# Patient Record
Sex: FEMALE | Race: WHITE | NOT HISPANIC OR LATINO | Employment: FULL TIME | URBAN - METROPOLITAN AREA
[De-identification: names, ages, dates, MRNs, and addresses within clinical notes are randomized per-mention and may not be internally consistent; named-entity substitution may affect disease eponyms.]

---

## 2017-11-20 ENCOUNTER — ALLSCRIPTS OFFICE VISIT (OUTPATIENT)
Dept: OTHER | Facility: OTHER | Age: 27
End: 2017-11-20

## 2017-11-21 NOTE — PROGRESS NOTES
Assessment  1  Encounter for gynecological examination with abnormal finding (V72 31) (Z01 411)   2  Cervical cancer screening (V76 2) (Z12 4)    Plan  Cervical cancer screening    · (Q) THINPREP TIS PAP RFX HPV; Status:Active; Requested for:2017;    Perform:[facility]; Order Comments:28 yo P1 POP for BC, no LMP  no hx abnl papcvx/endocvx; Due:2018; Ordered;cancer screening; Ordered By:Mini Cruz;    Discussion/Summary  Currently, she eats an adequate diet and has an adequate exercise regimen  Pap test was done today Breast cancer screening: breast cancer screening is not indicated  Colorectal cancer screening: colorectal cancer screening is not indicated  Osteoporosis screening: bone mineral density testing is not indicated  Advice and education were given regarding aerobic exercise, weight bearing exercise, calcium supplements, vitamin D supplements and reproductive health  Patient discussion: discussed with the patient  RTO if feels pregnant or no menses 3 months from last POP  The patient has the current Goals: Pregnancy  The patent has the current Barriers:   Patient is able to Self-Care  History of Present Illness  HPI: 33 yo G P1 c LMP none with lactation and POP  Discontinued POP 4-6 wks ago  No longer nursing  lifetime safe sexual practices followed does/not feel safe in relationship2-3x/dyD in [facility] videos 4-5x/wkas abovevaccination: completed seriesNA      OB History  Pregnancy History (Brief):  Prior pregnancies: : 1   Para: 1 (full-term)-- and-- 1 (living)  Delivery type: 1 vaginal       Past Medical History   · Denied: History of abnormal cervical Pap smear   · History of herpes simplex infection (V12 09) (Z86 19)   · History of pregnancy (V13 29)   · Denied: History of sexually transmitted disease   · History of vaccination against human papillomavirus (V45 89) (Z92 29)   · History of Varicella without complication (914 5) (X81 7)    The active problems and past medical history were reviewed and updated today  Surgical History   · Denied: History Of Prior Surgery    The surgical history was reviewed and updated today  Family History  Father    · Family history of hypertension (V17 49) (Z82 49)  Paternal Grandmother    · Family history of stroke (V17 1) (Z82 3)  Family History    · Denied: Family history of chronic renal disease   · Denied: Family history of cirrhosis of liver   · Denied: Family history of depression   · Denied: Family history of DVT   · Denied: Family history of hypercholesterolemia   · Denied: Family history of ischemic heart disease   · Denied: Family history of osteoporosis   · Denied: Family history of schizophrenia   · Denied: Family history of thyroid disorder   · Denied: Family history of type 2 diabetes mellitus    The family history was reviewed and updated today  Social History     · Alcohol use (V49 89) (Z78 9)   · 1x/wk when not pregnant   · Denied: History of Drug use   · Never a smoker   · No preference on Zoroastrianism beliefs   · Occupation   ·  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   · Denied: History of Judaism beliefs on blood and blood products  The social history was reviewed and updated today  Current Meds   1  Daily Multivitamin TABS; Therapy: (Recorded:20Nov2017) to Recorded    Allergies  1  No Known Drug Allergies    Vitals   Recorded: [id number] 21:12TO   Systolic 266, LUE, Sitting   Diastolic 60, LUE, Sitting   Height 5 ft 8 9 in   Weight 150 lb    BMI Calculated 22 21   BSA Calculated 1 83       Physical Exam   Constitutional  General appearance: No acute distress, well appearing and well nourished  Neck  Neck: Normal, supple, trachea midline, no masses  Thyroid: Normal, no thyromegaly  Pulmonary  Respiratory effort: No increased work of breathing or signs of respiratory distress  Genitourinary  External genitalia: Normal and no lesions appreciated  Vagina: Normal, no lesions or dryness appreciated  Urethral meatus: Normal    Bladder: Normal, soft, non-tender and no prolapse or masses appreciated  Cervix: Normal, no palpable masses  -- parous s lesions  m od ectropion  Uterus: Normal, non-tender, not enlarged, and no palpable masses  -- NSSC, AF, NT, mobile  Adnexa/parametria: Normal, non-tender and no fullness or masses appreciated  -- no masses or tenderenss  Anus, perineum, and rectum: Normal sphincter tone, no masses, and no prolapse  No masses or nodularity  Chest  Breasts: Normal and no dimpling or skin changes noted  Abdomen  Abdomen: Normal, non-tender, and no organomegaly noted  Liver and spleen: No hepatomegaly or splenomegaly  Examination for hernias: No hernias appreciated  Lymphatic  Palpation of lymph nodes in neck, axillae, groin and/or other locations: No lymphadenopathy or masses noted  Skin  Skin and subcutaneous tissue: Normal skin turgor and no rashes     Psychiatric  Orientation to person, place, and time: Normal    Mood and affect: Normal        Signatures   Electronically signed by : KEANU Curry ; Nov 20 2017  4:31PM EST                       (Author)

## 2017-11-22 LAB
ADDITIONAL INFORMATION (HISTORICAL): NORMAL
ADEQUACY: (HISTORICAL): NORMAL
COMMENT (HISTORICAL): NORMAL
CYTOTECHNOLOGIST: (HISTORICAL): NORMAL
INTERPRETATION (HISTORICAL): NORMAL
LMP (HISTORICAL): NORMAL
PREV. BX: (HISTORICAL): NORMAL
PREV. PAP (HISTORICAL): NORMAL
SOURCE (HISTORICAL): NORMAL

## 2017-11-25 ENCOUNTER — GENERIC CONVERSION - ENCOUNTER (OUTPATIENT)
Dept: OTHER | Facility: OTHER | Age: 27
End: 2017-11-25

## 2018-01-11 NOTE — RESULT NOTES
Verified Results  (Q) THINPREP TIS PAP RFX HPV [id number] 12:00AM [facility]     Test Name Result Flag Reference   CLINICAL INFORMATION: None given     LMP: NONE     PREV  PAP: NO HX ABNL PAP     PREV  BX: NONE GIVEN     SOURCE: Cervix, Endocervix     STATEMENT OF ADEQUACY:      Satisfactory for evaluation  Endocervical/transformation zone component absent  Age and/or menstrual status not provided   INTERPRETATION/RESULT:      Negative for intraepithelial lesion or malignancy  COMMENT:      This Pap test has been evaluated with computer  assisted technology     CYTOTECHNOLOGIST:      AISLINN CT(ASCP)  CT screening location: [address] [place] [address]

## 2018-01-13 VITALS
SYSTOLIC BLOOD PRESSURE: 100 MMHG | BODY MASS INDEX: 22.22 KG/M2 | DIASTOLIC BLOOD PRESSURE: 60 MMHG | WEIGHT: 150 LBS | HEIGHT: 69 IN

## 2018-02-26 ENCOUNTER — OFFICE VISIT (OUTPATIENT)
Dept: OBGYN CLINIC | Facility: CLINIC | Age: 28
End: 2018-02-26
Payer: COMMERCIAL

## 2018-02-26 VITALS — BODY MASS INDEX: 22.07 KG/M2 | WEIGHT: 149 LBS

## 2018-02-26 DIAGNOSIS — N94.89 SUPPRESSION, MENSTRUATION: Primary | ICD-10-CM

## 2018-02-26 LAB — SL AMB POCT URINE HCG: POSITIVE

## 2018-02-26 PROCEDURE — 81025 URINE PREGNANCY TEST: CPT

## 2018-02-27 NOTE — PROGRESS NOTES
Pt  Presents for walk in HCG test  In office test positive  She is currently 5wks 1 day with an LMP 01/20/2018  LITO 10/25/2018

## 2018-03-30 ENCOUNTER — TRANSCRIBE ORDERS (OUTPATIENT)
Dept: ADMINISTRATIVE | Facility: HOSPITAL | Age: 28
End: 2018-03-30

## 2018-03-30 DIAGNOSIS — Z34.81 PRENATAL CARE, SUBSEQUENT PREGNANCY, FIRST TRIMESTER: Primary | ICD-10-CM

## 2018-04-02 ENCOUNTER — HOSPITAL ENCOUNTER (OUTPATIENT)
Dept: ULTRASOUND IMAGING | Facility: HOSPITAL | Age: 28
Discharge: HOME | End: 2018-04-02
Attending: OBSTETRICS & GYNECOLOGY

## 2018-04-02 DIAGNOSIS — Z34.81 PRENATAL CARE, SUBSEQUENT PREGNANCY, FIRST TRIMESTER: ICD-10-CM

## 2018-04-17 ENCOUNTER — ROUTINE PRENATAL (OUTPATIENT)
Dept: PERINATAL CARE | Facility: CLINIC | Age: 28
End: 2018-04-17
Payer: COMMERCIAL

## 2018-04-17 VITALS
HEART RATE: 66 BPM | DIASTOLIC BLOOD PRESSURE: 76 MMHG | SYSTOLIC BLOOD PRESSURE: 116 MMHG | WEIGHT: 150 LBS | HEIGHT: 70 IN | BODY MASS INDEX: 21.47 KG/M2

## 2018-04-17 DIAGNOSIS — Z3A.12 12 WEEKS GESTATION OF PREGNANCY: ICD-10-CM

## 2018-04-17 DIAGNOSIS — Z82.79 FAMILY HISTORY OF CONGENITAL HEART DEFECT: ICD-10-CM

## 2018-04-17 DIAGNOSIS — Z36.82 ENCOUNTER FOR ANTENATAL SCREENING FOR NUCHAL TRANSLUCENCY: Primary | ICD-10-CM

## 2018-04-17 PROCEDURE — 76813 OB US NUCHAL MEAS 1 GEST: CPT | Performed by: OBSTETRICS & GYNECOLOGY

## 2018-04-17 PROCEDURE — 76801 OB US < 14 WKS SINGLE FETUS: CPT | Performed by: OBSTETRICS & GYNECOLOGY

## 2018-04-17 PROCEDURE — 99201 PR OFFICE OUTPATIENT NEW 10 MINUTES: CPT | Performed by: OBSTETRICS & GYNECOLOGY

## 2018-04-17 NOTE — PROGRESS NOTES
MARY Carlos 53: Ms Sukumar Noble was seen today at 12w2d for nuchal translucency ultrasound  See ultrasound report under "OB Procedures" tab  Please don't hesitate to contact our office with any concerns or questions    Jami Snow MD

## 2018-04-17 NOTE — PATIENT INSTRUCTIONS
Thank you for choosing [facility] for your  care today  If you have any questions about your ultrasound or care, please do not hesitate to contact us or your primary obstetrician  Please return in 7-8 weeks for your next ultrasound to check on the fetal anatomy  I also advise fetal echocardiogram at 22-24 weeks  If you can find them, please bring in your records and your partner records on the cystic fibrosis testing so that we can review

## 2018-04-26 ENCOUNTER — TELEPHONE (OUTPATIENT)
Dept: PERINATAL CARE | Facility: CLINIC | Age: 28
End: 2018-04-26

## 2018-05-31 ENCOUNTER — TRANSCRIBE ORDERS (OUTPATIENT)
Dept: ADMINISTRATIVE | Facility: HOSPITAL | Age: 28
End: 2018-05-31

## 2018-05-31 ENCOUNTER — APPOINTMENT (OUTPATIENT)
Dept: LAB | Facility: HOSPITAL | Age: 28
End: 2018-05-31
Attending: OBSTETRICS & GYNECOLOGY
Payer: COMMERCIAL

## 2018-05-31 DIAGNOSIS — Z33.1 PREGNANT STATE, INCIDENTAL: ICD-10-CM

## 2018-05-31 DIAGNOSIS — Z36.9 RESEARCH REQUESTED ANTENATAL ULTRASOUND SCAN: ICD-10-CM

## 2018-05-31 DIAGNOSIS — Z36.9 RESEARCH REQUESTED ANTENATAL ULTRASOUND SCAN: Primary | ICD-10-CM

## 2018-05-31 PROCEDURE — 36415 COLL VENOUS BLD VENIPUNCTURE: CPT

## 2018-06-01 LAB — SCAN RESULT: NORMAL

## 2018-06-05 ENCOUNTER — ROUTINE PRENATAL (OUTPATIENT)
Dept: PERINATAL CARE | Facility: CLINIC | Age: 28
End: 2018-06-05
Payer: COMMERCIAL

## 2018-06-05 VITALS
BODY MASS INDEX: 21.9 KG/M2 | HEIGHT: 70 IN | SYSTOLIC BLOOD PRESSURE: 100 MMHG | WEIGHT: 153 LBS | HEART RATE: 78 BPM | DIASTOLIC BLOOD PRESSURE: 68 MMHG

## 2018-06-05 DIAGNOSIS — Z14.1 CYSTIC FIBROSIS CARRIER IN SECOND TRIMESTER, ANTEPARTUM: ICD-10-CM

## 2018-06-05 DIAGNOSIS — O09.892 CYSTIC FIBROSIS CARRIER IN SECOND TRIMESTER, ANTEPARTUM: ICD-10-CM

## 2018-06-05 DIAGNOSIS — Z36.3 ENCOUNTER FOR ANTENATAL SCREENING FOR MALFORMATIONS: Primary | ICD-10-CM

## 2018-06-05 DIAGNOSIS — Z36.86 ENCOUNTER FOR ANTENATAL SCREENING FOR CERVICAL LENGTH: ICD-10-CM

## 2018-06-05 DIAGNOSIS — Z82.79 FAMILY HISTORY OF CONGENITAL HEART DEFECT: ICD-10-CM

## 2018-06-05 DIAGNOSIS — Z3A.19 19 WEEKS GESTATION OF PREGNANCY: ICD-10-CM

## 2018-06-05 PROCEDURE — 99212 OFFICE O/P EST SF 10 MIN: CPT | Performed by: OBSTETRICS & GYNECOLOGY

## 2018-06-05 NOTE — PROGRESS NOTES
MARY Carlos 53: Ms William Jones was seen today at 19w2d for anatomic survey and cervical length screening ultrasound  See ultrasound report under "OB Procedures" tab  Please don't hesitate to contact our office with any concerns or questions    Linda Randall MD

## 2018-06-05 NOTE — PATIENT INSTRUCTIONS
Thank you for choosing [facility] for your  care today  If you have any questions about your ultrasound or care, please do not hesitate to contact us or your primary obstetrician      Please return in 4 weeks for your next ultrasound to check on the fetal echocardiogram

## 2018-06-05 NOTE — PROGRESS NOTES
A transvaginal ultrasound was performed  Sonographer note on use of High Level Disinfection Process (Trophon) for transvaginal probe# 2 used, serial [id number]    [address] Hand

## 2018-06-26 ENCOUNTER — ROUTINE PRENATAL (OUTPATIENT)
Dept: PERINATAL CARE | Facility: CLINIC | Age: 28
End: 2018-06-26
Payer: COMMERCIAL

## 2018-06-26 VITALS
WEIGHT: 158 LBS | SYSTOLIC BLOOD PRESSURE: 105 MMHG | BODY MASS INDEX: 22.62 KG/M2 | HEIGHT: 70 IN | DIASTOLIC BLOOD PRESSURE: 67 MMHG

## 2018-06-26 DIAGNOSIS — Z82.79 FAMILY HISTORY OF CONGENITAL HEART DEFECT: Primary | ICD-10-CM

## 2018-06-26 DIAGNOSIS — Z3A.22 22 WEEKS GESTATION OF PREGNANCY: ICD-10-CM

## 2018-06-26 PROCEDURE — 93325 DOPPLER ECHO COLOR FLOW MAPG: CPT | Performed by: OBSTETRICS & GYNECOLOGY

## 2018-06-26 PROCEDURE — 76825 ECHO EXAM OF FETAL HEART: CPT | Performed by: OBSTETRICS & GYNECOLOGY

## 2018-06-26 PROCEDURE — 76827 ECHO EXAM OF FETAL HEART: CPT | Performed by: OBSTETRICS & GYNECOLOGY

## 2018-07-26 ENCOUNTER — ULTRASOUND (OUTPATIENT)
Dept: PERINATAL CARE | Facility: CLINIC | Age: 28
End: 2018-07-26
Payer: COMMERCIAL

## 2018-07-26 VITALS
SYSTOLIC BLOOD PRESSURE: 108 MMHG | WEIGHT: 165 LBS | BODY MASS INDEX: 23.62 KG/M2 | HEART RATE: 76 BPM | HEIGHT: 70 IN | DIASTOLIC BLOOD PRESSURE: 64 MMHG

## 2018-07-26 DIAGNOSIS — Z3A.26 26 WEEKS GESTATION OF PREGNANCY: ICD-10-CM

## 2018-07-26 DIAGNOSIS — O35.2XX0 HEREDITARY DISEASE IN FAMILY POSSIBLY AFFECTING FETUS, AFFECTING MANAGEMENT OF MOTHER IN PREGNANCY, SINGLE OR UNSPECIFIED FETUS: ICD-10-CM

## 2018-07-26 DIAGNOSIS — Z36.4 ULTRASOUND FOR ANTENATAL SCREENING FOR FETAL GROWTH RETARDATION: Primary | ICD-10-CM

## 2018-07-26 PROCEDURE — 76816 OB US FOLLOW-UP PER FETUS: CPT | Performed by: OBSTETRICS & GYNECOLOGY

## 2018-07-26 PROCEDURE — 99212 OFFICE O/P EST SF 10 MIN: CPT | Performed by: OBSTETRICS & GYNECOLOGY

## 2018-07-26 NOTE — PROGRESS NOTES
Please refer to the [facility] ultrasound report in Ob Procedures for additional information regarding the visit to the [facility]  today

## 2018-11-02 ENCOUNTER — ROUTINE PRENATAL (OUTPATIENT)
Dept: PERINATAL CARE | Facility: CLINIC | Age: 28
End: 2018-11-02
Payer: COMMERCIAL

## 2018-11-02 ENCOUNTER — TRANSCRIBE ORDERS (OUTPATIENT)
Dept: PERINATAL CARE | Facility: CLINIC | Age: 28
End: 2018-11-02

## 2018-11-02 VITALS
HEART RATE: 77 BPM | SYSTOLIC BLOOD PRESSURE: 115 MMHG | DIASTOLIC BLOOD PRESSURE: 69 MMHG | HEIGHT: 70 IN | BODY MASS INDEX: 26.8 KG/M2 | WEIGHT: 187.2 LBS

## 2018-11-02 DIAGNOSIS — Z82.79 FAMILY HISTORY OF CONGENITAL HEART DEFECT: ICD-10-CM

## 2018-11-02 DIAGNOSIS — Z14.1 CYSTIC FIBROSIS CARRIER IN SECOND TRIMESTER, ANTEPARTUM: ICD-10-CM

## 2018-11-02 DIAGNOSIS — Z3A.40 40 WEEKS GESTATION OF PREGNANCY: Primary | ICD-10-CM

## 2018-11-02 DIAGNOSIS — O09.892 CYSTIC FIBROSIS CARRIER IN SECOND TRIMESTER, ANTEPARTUM: ICD-10-CM

## 2018-11-02 PROCEDURE — 76815 OB US LIMITED FETUS(S): CPT | Performed by: OBSTETRICS & GYNECOLOGY

## 2018-11-02 PROCEDURE — 59025 FETAL NON-STRESS TEST: CPT | Performed by: OBSTETRICS & GYNECOLOGY

## 2018-11-02 NOTE — PROGRESS NOTES
Ms Laya Serra is a 28 yo  at 36 5/7  weeks gestation who presents for a fetal ultrasound examination for evaluation of HANG and NST  Hx of term pregnancy and of a previous pregnancy complicated by cardiac defect     She is asymptomatic  See Ob procedures  1  HANG  wnl at 16 5  2  NST reactive  Recommendations; 1  Twice a week NSTs, once a week HANG  2  Fetal movement counts      Job Villarreal MD

## 2018-11-02 NOTE — PROGRESS NOTES
nursing education was provided by Irene Moffett RN today on kick counting, labor precautions, and on warning signs that should prompt her to call her physician  The NST procedure and expected outcome were explained to patient  Preeclampsia precautions were reviewed where applicable  She was instructed to call her obstetrician with any questions or concerns, to monitor fetal movement, and to notify her obstetrician if she notices decreased fetal movement    She verbalized understanding and all questions were answered  -Irene Moffett RN

## 2018-11-05 ENCOUNTER — ROUTINE PRENATAL (OUTPATIENT)
Dept: PERINATAL CARE | Facility: CLINIC | Age: 28
End: 2018-11-05
Payer: COMMERCIAL

## 2018-11-05 VITALS
SYSTOLIC BLOOD PRESSURE: 116 MMHG | HEIGHT: 70 IN | DIASTOLIC BLOOD PRESSURE: 81 MMHG | WEIGHT: 189 LBS | HEART RATE: 78 BPM | BODY MASS INDEX: 27.06 KG/M2

## 2018-11-05 DIAGNOSIS — O48.0 POST TERM PREGNANCY AT 41 WEEKS GESTATION: Primary | ICD-10-CM

## 2018-11-05 DIAGNOSIS — Z3A.41 POST TERM PREGNANCY AT 41 WEEKS GESTATION: Primary | ICD-10-CM

## 2018-11-05 PROCEDURE — 76815 OB US LIMITED FETUS(S): CPT | Performed by: OBSTETRICS & GYNECOLOGY

## 2018-11-05 PROCEDURE — 59025 FETAL NON-STRESS TEST: CPT | Performed by: OBSTETRICS & GYNECOLOGY

## 2018-11-05 NOTE — PROGRESS NOTES
MARY Carlos 53: Ms John Kruse was seen today at 41w1d for NST (found under the pregnancy episode) which I reviewed the RN assessment and agree, and HANG (see ultrasound report under OB procedures tab)  Please don't hesitate to contact our office with any concerns or questions    Aster Martinez MD

## 2018-11-07 ENCOUNTER — ANESTHESIA EVENT (INPATIENT)
Dept: LABOR AND DELIVERY | Facility: HOSPITAL | Age: 28
End: 2018-11-07
Payer: COMMERCIAL

## 2018-11-07 ENCOUNTER — HOSPITAL ENCOUNTER (INPATIENT)
Facility: HOSPITAL | Age: 28
LOS: 1 days | Discharge: HOME | End: 2018-11-08
Attending: OBSTETRICS & GYNECOLOGY | Admitting: OBSTETRICS & GYNECOLOGY
Payer: COMMERCIAL

## 2018-11-07 ENCOUNTER — ANESTHESIA (INPATIENT)
Dept: LABOR AND DELIVERY | Facility: HOSPITAL | Age: 28
End: 2018-11-07
Payer: COMMERCIAL

## 2018-11-07 ENCOUNTER — HOSPITAL ENCOUNTER (OUTPATIENT)
Dept: LABOR AND DELIVERY | Facility: HOSPITAL | Age: 28
Discharge: HOME | End: 2018-11-07
Payer: COMMERCIAL

## 2018-11-07 DIAGNOSIS — Z3A.41 41 WEEKS GESTATION OF PREGNANCY: Primary | ICD-10-CM

## 2018-11-07 PROBLEM — O48.0 41 WEEKS GESTATION OF PREGNANCY: Status: ACTIVE | Noted: 2018-11-07

## 2018-11-07 LAB
ABO GROUP BLD: NORMAL
BASOPHILS # BLD AUTO: 0.05 THOUSANDS/ΜL (ref 0–0.1)
BASOPHILS NFR BLD AUTO: 0 % (ref 0–1)
BLD GP AB SCN SERPL QL: POSITIVE
BLOOD GROUP ANTIBODIES SERPL: NORMAL
EOSINOPHIL # BLD AUTO: 0.14 THOUSAND/ΜL (ref 0–0.61)
EOSINOPHIL NFR BLD AUTO: 1 % (ref 0–6)
ERYTHROCYTE [DISTWIDTH] IN BLOOD BY AUTOMATED COUNT: 13.2 % (ref 11.6–15.1)
HCT VFR BLD AUTO: 36.9 % (ref 34.8–46.1)
HGB BLD-MCNC: 12.8 G/DL (ref 11.5–15.4)
IMM GRANULOCYTES # BLD AUTO: 0.06 THOUSAND/UL (ref 0–0.2)
IMM GRANULOCYTES NFR BLD AUTO: 1 % (ref 0–2)
LYMPHOCYTES # BLD AUTO: 1.99 THOUSANDS/ΜL (ref 0.6–4.47)
LYMPHOCYTES NFR BLD AUTO: 17 % (ref 14–44)
MCH RBC QN AUTO: 33.7 PG (ref 26.8–34.3)
MCHC RBC AUTO-ENTMCNC: 34.7 G/DL (ref 31.4–37.4)
MCV RBC AUTO: 97 FL (ref 82–98)
MONOCYTES # BLD AUTO: 0.85 THOUSAND/ΜL (ref 0.17–1.22)
MONOCYTES NFR BLD AUTO: 7 % (ref 4–12)
NEUTROPHILS # BLD AUTO: 8.52 THOUSANDS/ΜL (ref 1.85–7.62)
NEUTS SEG NFR BLD AUTO: 74 % (ref 43–75)
NRBC BLD AUTO-RTO: 0 /100 WBCS
PLATELET # BLD AUTO: 202 THOUSANDS/UL (ref 149–390)
PMV BLD AUTO: 9.7 FL (ref 8.9–12.7)
RBC # BLD AUTO: 3.8 MILLION/UL (ref 3.81–5.12)
RH BLD: NEGATIVE
SPECIMEN EXPIRATION DATE: NORMAL
WBC # BLD AUTO: 11.61 THOUSAND/UL (ref 4.31–10.16)

## 2018-11-07 PROCEDURE — 86870 RBC ANTIBODY IDENTIFICATION: CPT | Performed by: OBSTETRICS & GYNECOLOGY

## 2018-11-07 PROCEDURE — 85025 COMPLETE CBC W/AUTO DIFF WBC: CPT | Performed by: OBSTETRICS & GYNECOLOGY

## 2018-11-07 PROCEDURE — 86850 RBC ANTIBODY SCREEN: CPT | Performed by: OBSTETRICS & GYNECOLOGY

## 2018-11-07 PROCEDURE — 0HQ9XZZ REPAIR PERINEUM SKIN, EXTERNAL APPROACH: ICD-10-PCS | Performed by: OBSTETRICS & GYNECOLOGY

## 2018-11-07 PROCEDURE — 86592 SYPHILIS TEST NON-TREP QUAL: CPT | Performed by: OBSTETRICS & GYNECOLOGY

## 2018-11-07 PROCEDURE — 0UBGXZZ EXCISION OF VAGINA, EXTERNAL APPROACH: ICD-10-PCS | Performed by: OBSTETRICS & GYNECOLOGY

## 2018-11-07 PROCEDURE — 86901 BLOOD TYPING SEROLOGIC RH(D): CPT | Performed by: OBSTETRICS & GYNECOLOGY

## 2018-11-07 PROCEDURE — 10907ZC DRAINAGE OF AMNIOTIC FLUID, THERAPEUTIC FROM PRODUCTS OF CONCEPTION, VIA NATURAL OR ARTIFICIAL OPENING: ICD-10-PCS | Performed by: OBSTETRICS & GYNECOLOGY

## 2018-11-07 PROCEDURE — 86900 BLOOD TYPING SEROLOGIC ABO: CPT | Performed by: OBSTETRICS & GYNECOLOGY

## 2018-11-07 RX ORDER — ONDANSETRON 2 MG/ML
4 INJECTION INTRAMUSCULAR; INTRAVENOUS EVERY 6 HOURS PRN
Status: DISCONTINUED | OUTPATIENT
Start: 2018-11-07 | End: 2018-11-07

## 2018-11-07 RX ORDER — SODIUM CHLORIDE, SODIUM LACTATE, POTASSIUM CHLORIDE, CALCIUM CHLORIDE 600; 310; 30; 20 MG/100ML; MG/100ML; MG/100ML; MG/100ML
125 INJECTION, SOLUTION INTRAVENOUS CONTINUOUS
Status: DISCONTINUED | OUTPATIENT
Start: 2018-11-07 | End: 2018-11-07

## 2018-11-07 RX ORDER — BUPIVACAINE HYDROCHLORIDE 2.5 MG/ML
INJECTION, SOLUTION EPIDURAL; INFILTRATION; INTRACAUDAL
Status: DISPENSED
Start: 2018-11-07 | End: 2018-11-08

## 2018-11-07 RX ORDER — ONDANSETRON 2 MG/ML
4 INJECTION INTRAMUSCULAR; INTRAVENOUS EVERY 8 HOURS PRN
Status: DISCONTINUED | OUTPATIENT
Start: 2018-11-07 | End: 2018-11-08 | Stop reason: HOSPADM

## 2018-11-07 RX ORDER — CALCIUM CARBONATE 200(500)MG
1000 TABLET,CHEWABLE ORAL DAILY PRN
Status: DISCONTINUED | OUTPATIENT
Start: 2018-11-07 | End: 2018-11-08 | Stop reason: HOSPADM

## 2018-11-07 RX ORDER — DOCUSATE SODIUM 100 MG/1
100 CAPSULE, LIQUID FILLED ORAL 2 TIMES DAILY
Status: DISCONTINUED | OUTPATIENT
Start: 2018-11-07 | End: 2018-11-08 | Stop reason: HOSPADM

## 2018-11-07 RX ORDER — OXYTOCIN/RINGER'S LACTATE 30/500 ML
PLASTIC BAG, INJECTION (ML) INTRAVENOUS
Status: COMPLETED
Start: 2018-11-07 | End: 2018-11-07

## 2018-11-07 RX ORDER — OXYTOCIN/RINGER'S LACTATE 30/500 ML
30 PLASTIC BAG, INJECTION (ML) INTRAVENOUS CONTINUOUS
Status: DISCONTINUED | OUTPATIENT
Start: 2018-11-07 | End: 2018-11-08 | Stop reason: HOSPADM

## 2018-11-07 RX ORDER — IBUPROFEN 600 MG/1
600 TABLET ORAL EVERY 6 HOURS PRN
Status: DISCONTINUED | OUTPATIENT
Start: 2018-11-07 | End: 2018-11-08 | Stop reason: HOSPADM

## 2018-11-07 RX ORDER — ACETAMINOPHEN 325 MG/1
650 TABLET ORAL EVERY 6 HOURS PRN
Status: DISCONTINUED | OUTPATIENT
Start: 2018-11-07 | End: 2018-11-08 | Stop reason: HOSPADM

## 2018-11-07 RX ORDER — ROPIVACAINE HYDROCHLORIDE 2 MG/ML
INJECTION, SOLUTION EPIDURAL; INFILTRATION; PERINEURAL
Status: DISCONTINUED
Start: 2018-11-07 | End: 2018-11-07 | Stop reason: WASHOUT

## 2018-11-07 RX ADMIN — Medication 30 UNITS: at 13:00

## 2018-11-07 RX ADMIN — IBUPROFEN 600 MG: 600 TABLET, FILM COATED ORAL at 14:14

## 2018-11-07 RX ADMIN — SODIUM CHLORIDE, SODIUM LACTATE, POTASSIUM CHLORIDE, AND CALCIUM CHLORIDE 125 ML/HR: .6; .31; .03; .02 INJECTION, SOLUTION INTRAVENOUS at 11:57

## 2018-11-07 RX ADMIN — DOCUSATE SODIUM 100 MG: 100 CAPSULE, LIQUID FILLED ORAL at 18:27

## 2018-11-07 RX ADMIN — ACETAMINOPHEN 650 MG: 325 TABLET, FILM COATED ORAL at 20:15

## 2018-11-07 RX ADMIN — BENZOCAINE AND LEVOMENTHOL: 200; 5 SPRAY TOPICAL at 18:27

## 2018-11-07 NOTE — H&P
H&P Exam - Obstetrics   Taniya Valerio 29 y o  female MRN: [id number]  Unit/Bed#: L&D 324-01 Encounter: [id number]    >2 Midnights    INPATIENT     Pt is under the care of Dr Pranay Parry    History of Present Illness   Chief Complaint: Here for Induction of Labor    HPI:  Taniya Valerio is a 29 y o   female with an LITO of 10/28/2018, by Last Menstrual Period at 41w3d weeks gestation who is being admitted for induction of labor for late-term gestation  Contractions have been present daily for the last week but are infrequent and often go unnoticed  Patient confirms fetal movement  Patient denies any leakage of fluid or vaginal bleeding  Her current obstetrical history is significant for Rh negative, Hx of Herpes on Valtrex suppression, Anemia, previous child with heart defect, CF carrier  Review of Systems   Constitutional: Negative for chills and fever  Eyes: Negative for visual disturbance  Respiratory: Negative for shortness of breath  Cardiovascular: Negative for chest pain  Gastrointestinal: Negative for abdominal pain, constipation, diarrhea, nausea and vomiting  Genitourinary: Negative for genital sores and vaginal bleeding  Neurological: Negative for weakness, light-headedness and headaches         Historical Information   OB History    Para Term  AB Living   2 1 1 0 0 1   SAB TAB Ectopic Multiple Live Births           1      # Outcome Date GA Lbr Pedro/2nd Weight Sex Delivery Anes PTL Lv   2 Current            1 Term 10/26/16 41w0d  3289 g (7 lb 4 oz) M Vag-Spont EPI N ALISON        Baby complications/comments: VTX, EFW: 7 5lbs  Past Medical History:   Diagnosis Date    Anemia     1 episode    Back problem 2018    did physical therapy earlier this year    Cystic fibrosis carrier     Herpes     Seasonal allergies     Varicella      Past Surgical History:   Procedure Laterality Date    NASAL SEPTUM SURGERY     [facility] REFRACTIVE SURGERY  2012     Social History History   Alcohol Use No     History   Drug Use No     History   Smoking Status    Never Smoker   Smokeless Tobacco    Never Used     Family History: non-contributory    Meds/Allergies   {  Prescriptions Prior to Admission   Medication    Prenatal Multivit-Min-Fe-FA (PRENATAL VITAMINS PO)    valACYclovir (VALTREX) 500 mg tablet     Allergies   Allergen Reactions    Amoxicillin-Pot Clavulanate Rash       Objective   Vitals: Blood pressure 131/72, pulse 94, temperature 98 1 °F (36 7 °C), temperature source Oral, resp  rate 18, height 5' 10" (1 778 m), weight 83 9 kg (185 lb), last menstrual period 01/21/2018, currently breastfeeding  Body mass index is 26 54 kg/m²  Invasive Devices          No matching active lines, drains, or airways          Physical Exam   Constitutional: She is oriented to person, place, and time  She appears well-developed and well-nourished  No distress  HENT:   Head: Normocephalic and atraumatic  Cardiovascular: Normal rate, regular rhythm and normal heart sounds  Exam reveals no gallop and no friction rub  No murmur heard  Pulmonary/Chest: Effort normal and breath sounds normal  No respiratory distress  She has no wheezes  She has no rales  Abdominal: Soft  Bowel sounds are normal  There is no tenderness  There is no rebound and no guarding  Gravid uterus   Genitourinary: Vagina normal    Neurological: She is alert and oriented to person, place, and time  Skin: She is not diaphoretic  Psychiatric: She has a normal mood and affect  Her behavior is normal  Judgment and thought content normal    Vitals reviewed  Vaginal Exam  Dilation: 4  Effacement (%): 80  Station: -1  Presentation: Vertex  Position: Unknown  Method: Manual  OB Examiner: Isidro Sweet  Membranes: Intact  FHR: Baseline: 120 bpm, Variability: Moderate 6 - 25 bpm, Accelerations: Reactive, Decelerations: Absent and Category 1    [place]: Frequency: Every 4-7 minutes    Prenatal Labs:    I have personally reviewed pertinent reports  , Blood Type:   Lab Results   Component Value Date/Time    ABO Grouping A 03/28/2018     , D (Rh type): negative  , Antibody Screen: negative , Varicella: positive history    , Rubella: immune     , VDRL/RPR:   Lab Results   Component Value Date/Time    RPR Non-Reactive 03/28/2018      , Hep B:   Lab Results   Component Value Date/Time    Hepatitis B Surface Ag negative 03/28/2018     , HIV: negative  , Chlamydia:   Lab Results   Component Value Date/Time    External Chlamydia Screen negative 04/26/2018     , Gonorrhea: negative  , Group B Strep:  negative       Imaging, EKG, Pathology, and Other Studies: I have personally reviewed pertinent reports        Assessment/Plan     Assessment:  IUP at 41w3d for IOL for late-term gestation  Plan:  1) Admit to L&D  2) Routine Labs: CBC, T&S, RPR  3) F/E/N: Clear Liquid diet, IV LR at 125mL/hr  4) Pain: Analgesia and/or epidural upon request  5) Induction with AROM initially, followed by pitocin    D/w Dr Mikey Morgan MD  OBGYN, PGY-2  11/7/2018 7:35 AM

## 2018-11-07 NOTE — LACTATION NOTE
This note was copied from a baby's chart  CONSULT - LACTATION  Baby Boy  Coleman Stoll) [place] 0 days male MRN: [id number]    [address] [facility] NURSE Room / Bed: L&D 314(N)/L&D 314(N) Encounter: [id number]    Maternal Information     MOTHER:  Vee Alanis  Maternal Age: 29 y o    OB History: #: 1, Date: 10/26/16, Sex: Male, Weight: 3289 g (7 lb 4 oz), GA: 41w0d, Delivery: Vaginal, Spontaneous Delivery, Apgar1: None, Apgar5: None, Living: Living, Birth Comments: None    #: 2, Date: 18, Sex: Male, Weight: 3374 g (7 lb 7 oz), GA: 41w3d, Delivery: Vaginal, Spontaneous Delivery, Apgar1: 8, Apgar5: 9, Living: Living, Birth Comments: None   Previouse breast reduction surgery? No    Lactation history:   Has patient previously breast fed: Yes   How long had patient previously breast fed: 8 months   Previous breast feeding complications: None     Past Surgical History:   Procedure Laterality Date    NASAL SEPTUM SURGERY      REFRACTIVE SURGERY         Birth information:  YOB: 2018   Time of birth: 12:53 PM   Sex: male   Delivery type: Vaginal, Spontaneous Delivery   Birth Weight: 3374 g (7 lb 7 oz)   Percent of Weight Change: 0%     Gestational Age: 45w2d   [unfilled]    Assessment     Breast and nipple assessment: baby sleeping, family at bedside, not assessed at this time     Assessment: baby sleeping, not assessed at this time    Feeding assessment: feeding well as per mom  LATCH:  Latch: Audible Swallowing:     Type of Nipple:     Comfort (Breast/Nipple):     Hold (Positioning):     LATCH Score:            Feeding recommendations:  breast feed on demand     Met with mother  Provided mother with Ready, Set, Baby booklet  Discussed Skin to Skin contact an benefits to mom and baby  Talked about the delay of the first bath until baby has adjusted  Spoke about the benefits of rooming in   Feeding on cue and what that means for recognizing infant's hunger  Avoidance of pacifiers for the first month discussed  Talked about exclusive breastfeeding for the first 6 months  Positioning and latch reviewed as well as showing images of other feeding positions  Discussed the properties of a good latch in any position  Reviewed hand/manual expression  Discussed s/s that baby is getting enough milk and some s/s that breastfeeding dyad may need further help  Gave information on common concerns, what to expect the first few weeks after delivery, preparing for other caregivers, and how partners can help  Resources for support also provided  Experienced Mother verbalized breastfeeding is going well  Family supportive at bedside  Enc to call for assistance as needed,phone # given      Davina Hand RN 11/7/2018 4:40 PM

## 2018-11-07 NOTE — PLAN OF CARE
BIRTH - VAGINAL/ SECTION     Fetal and maternal status remain reassuring during the birth process [de-identified]     Emotionally satisfying birthing experience for mother/fetus [address] Discharge to home or other facility with appropriate resources Progressing        INFECTION - ADULT     Absence or prevention of progression during hospitalization Progressing     Absence of fever/infection during neutropenic period Progressing        Knowledge Deficit     Patient/family/caregiver demonstrates understanding of disease process, treatment plan, medications, and discharge instructions Progressing     Verbalizes understanding of labor plan Progressing        Labor & Delivery     Manages discomfort Progressing     Patient vital signs are stable Progressing        PAIN - ADULT     Verbalizes/displays adequate comfort level or baseline comfort level Progressing        SAFETY ADULT     Patient will remain free of falls Progressing     Maintain or return to baseline ADL function Progressing     Maintain or return mobility status to optimal level Progressing

## 2018-11-07 NOTE — L&D DELIVERY NOTE
Delivery Summary - OB/GYN   Beth Rivas 29 y o  female MRN: [id number]  Unit/Bed#: L&D 324-01 Encounter: [id number]    Pre-delivery Diagnosis:   1  41w3d pregnancy, Rh negative, H/O HSV + on suppression, CF carrier, h/o bipolar disorder    Post-delivery Diagnosis: delivered live male infant    Attending: Dr Bree Benitez    Assistant(s): Dr Christina Betts, PGY1    Procedure: Spontaneous vaginal delivery with first degree perineal laceration repair, left 2cm vaginal laceration repaired and left introital inclusion cyst excision with repair  Anesthesia: None for delivery  Local Sensorcaine   25% for laceration repair  Estimated Blood Loss:  250 mL    Specimens:   1  Arterial and venous cord gases unable to be run; arterial sample too small, venous clotted  2  Cord blood  3  Segment of umbilical cord  4  Placenta to storage     Complications:  None apparent    Findings:  1  Viable male  delivered on 18 at 1253 weight unknown at time of documentation  Apgar scores of 8 at one minute and 9 at five minutes  2  Spontaneous delivery of placenta with paracentrally inserted 3-vessel cord  4  1st degree perineal laceration and 2cm left vaginal laceration, repaired with 3-0Vicryl  5  Vaginal inclusion cyst excised  Disposition: Patient tolerated the procedure well and was recovering in labor and delivery room with family and  before being transferred to the post-partum floor  Procedure Details     Description of procedure    After pushing for 10 minutes, on 18 at 1243 patient delivered a viable male , weight unknown at time of documentation, Apgars of 8 (1 min) and 9 (5 min)  The fetal vertex delivered spontaneously  There was no nuchal cord  The anterior shoulder delivered atraumatically with maternal expulsive forces and the assistance of downward traction  The posterior shoulder delivered with maternal expulsive forces and the assistance of upward traction   The remainder of the fetus delivered spontaneously  Upon delivery, the infant was placed on the mothers abdomen and the cord was clamped and cut after 60 second delay  The infant was noted to cry spontaneously and was moving all extremities appropriately  There was no evidence for injury  Awaiting nurse resuscitators evaluated the  at bedside  Arterial and venous cord blood gases and cord blood was collected for analysis  These were promptly sent to the lab  In the immediate post-partum, 30 units of IV pitocin was administered and the uterus was noted to contract down well with massage and pitocin  The placenta delivered spontaneously at 1310 and was noted to have a paracentrally inserted 3 vessel cord  The vagina, cervix, and perineum were inspected and there was noted to be 1st degree perineal laceration and 2cm left vaginal laceration  Laceration Repair  Patient was uncomfortable due to lack of epidural  Local anesthesia was obtained with 0 25% Sensorcaine  A 1st degree perineal and vaginal lacerations were identified and required repair  Lacerations was repaired with 3-0 Vicryl in running fashion to reapproximate the lacerations  Good hemostasis was confirmed at the conclusion of this procedure  A left introital inclusion cyst was identified and excised  The site was sutured with 3-0 vicryl and hemostasis achieved  At the conclusion of the delivery, all needle, sponge, and instrument counts were noted to be correct  Patient tolerated the procedure well and was allowed to recover in labor and delivery room with family and  before being transferred to the post-partum floor  Dr Bree Benitez was present and participated in all key portions of the case

## 2018-11-07 NOTE — DISCHARGE SUMMARY
Discharge Summary - OB/GYN   Kayla Sheriff 29 y o  female MRN: [id number]  Unit/Bed#: L&D 324-01 Encounter: [id number]      Admission Date: 2018     Discharge Date: 2018     Admitting Diagnosis:   1  Pregnancy at 41w3d  2  Induced Labor  3  Hx of Herpes on Valtrex suppression   4  Rh negative  5  CF carrier      Discharge Diagnosis:   Same, delivered      Procedures: spontaneous vaginal delivery  Repair of 1st degree perineal laceration and left labial laceration    Attending: Masha Huerta MD    Hospital Course:     Kayla Sheriff is a 29 y o   at 41w3d wks who was initially admitted for induction of labor for late term pregnancy  Induction began with AROM after which patient progressed into Labor  She was AROM'd for clear fluid at 0740  She progressed to complete dilation at 1210 and pushing at 1243  She delivered a viable male  on 2018 at 1253  Weight 7lbs 7oz via spontaneous vaginal delivery  Apgars were 8 (1 min) and 9 (5 min)   was transferred to  nursery  Patient tolerated the procedure well and was transferred to recovery in stable condition  Her post-partum course was uncomplicated  Her post-partum pain was well controlled with oral analgesics  On day of discharge, she was ambulating and able to reasonably perform all ADLs  She was voiding and had appropriate bowel function  Pain was well controlled  She was discharged home on post-partum day #1 without complications  Patient was instructed to follow up with her OB as an outpatient and was given appropriate warnings to call provider if she develops signs of infection or uncontrolled pain  Complications: none apparent    Condition at discharge: good     Discharge instructions/Information to patient and family:   See after visit summary for information provided to patient and family        Provisions for Follow-Up Care:  See after visit summary for information related to follow-up care and any pertinent home health orders  Disposition: Home    Planned Readmission: No    Discharge Medications: For a complete list of the patient's medications, please refer to her med rec  Ananya Thompson MD  [facility], [address] [place] [address]  11/8/2018 4:02 PM

## 2018-11-07 NOTE — PROGRESS NOTES
IV fluids ordered  Patient requesting to ambulate  [place] [facility] [place] stated that we can hold fluids until patient is more active

## 2018-11-07 NOTE — ANESTHESIA PREPROCEDURE EVALUATION
Review of Systems/Medical History  Patient summary reviewed  Chart reviewed  No history of anesthetic complications     Cardiovascular  Negative cardio ROS    Pulmonary  Negative pulmonary ROS        GI/Hepatic  Negative GI/hepatic ROS          Negative  ROS        Endo/Other  Negative endo/other ROS      GYN  Currently pregnant ,          Hematology  Negative hematology ROS Anemia ,     Musculoskeletal  Negative musculoskeletal ROS        Neurology  Negative neurology ROS      Psychology   Negative psychology ROS              Physical Exam    Airway    Mallampati score: II  TM Distance: >3 FB  Neck ROM: full     Dental   No notable dental hx     Cardiovascular  Comment: Negative ROS, Rhythm: regular, Rate: normal, Cardiovascular exam normal    Pulmonary  Pulmonary exam normal Breath sounds clear to auscultation,     Other Findings        Anesthesia Plan  ASA Score- 2     Anesthesia Type- epidural with ASA Monitors  Additional Monitors:   Airway Plan:         Plan Factors-    Induction-     Postoperative Plan-     Informed Consent- Anesthetic plan and risks discussed with patient

## 2018-11-08 VITALS
RESPIRATION RATE: 18 BRPM | DIASTOLIC BLOOD PRESSURE: 68 MMHG | HEART RATE: 82 BPM | BODY MASS INDEX: 26.48 KG/M2 | WEIGHT: 185 LBS | TEMPERATURE: 98.5 F | HEIGHT: 70 IN | SYSTOLIC BLOOD PRESSURE: 112 MMHG

## 2018-11-08 PROBLEM — Z14.1 CYSTIC FIBROSIS CARRIER IN SECOND TRIMESTER, ANTEPARTUM: Status: RESOLVED | Noted: 2018-06-05 | Resolved: 2018-11-08

## 2018-11-08 PROBLEM — Z3A.41 41 WEEKS GESTATION OF PREGNANCY: Status: RESOLVED | Noted: 2018-11-07 | Resolved: 2018-11-08

## 2018-11-08 PROBLEM — O09.892 CYSTIC FIBROSIS CARRIER IN SECOND TRIMESTER, ANTEPARTUM: Status: RESOLVED | Noted: 2018-06-05 | Resolved: 2018-11-08

## 2018-11-08 PROBLEM — O48.0 41 WEEKS GESTATION OF PREGNANCY: Status: RESOLVED | Noted: 2018-11-07 | Resolved: 2018-11-08

## 2018-11-08 LAB — RPR SER QL: NORMAL

## 2018-11-08 NOTE — LACTATION NOTE
This note was copied from a baby's chart  Met with mother to go over breastfeeding discharge booklet including the feeding log since birth for the first week  Emphasized 8 or more (12) feedings in a 24 hour period, what to expect for the number of diapers per day of life and the progression of properties of the  stooling pattern  Discussed s/s that breastfeeding is going well after day 4 and when to get help from a pediatrician or lactation support person after day 4  Booklet included Breast Pumping Instructions, When You Go Back to Work or School, and Breastfeeding Resources for after discharge including access to the number for the [employer]

## 2018-11-08 NOTE — DISCHARGE SUMMARY
Discharge Summary - Javy Puentes 29 y o  female MRN: [id number]    Unit/Bed#: L&D 689-42 Encounter: [id number]    Admission Date:   Admission Orders     Ordered        18  Inpatient Admission  Once               Admitting Diagnosis: Encounter for full-term uncomplicated delivery [W03]    HPI: Pt admitted 18 for post dates IOL  Procedures Performed: No orders of the defined types were placed in this encounter  Summary of Hospital Course: AROM performed and labor progressed spontaneously within a few hours of admission, she was fully dilated and delivered quickly without anesthesia  PP course was uneventful, pt met all milestones and was eager for d/c to home on PPD1  Significant Findings, Care, Treatment and Services Provided:     Complications: none    Discharge Diagnosis: term pregnancy; delivered    Resolved Problems  Date Reviewed: 2018          Resolved    Cystic fibrosis carrier in second trimester, antepartum 2018     Resolved by  Randolph George MD    41 weeks gestation of pregnancy 2018     Resolved by  Randolph George MD    * (Principal)Spontaneous vaginal delivery 2018     Resolved by  Randolph George MD          Condition at Discharge: good         Discharge instructions/Information to patient and family:   See after visit summary for information provided to patient and family  Provisions for Follow-Up Care:  See after visit summary for information related to follow-up care and any pertinent home health orders  PCP: Princess Zuniga PA-C    Disposition: Home    Planned Readmission: No    Discharge Statement   I spent  minutes discharging the patient  This time was spent on the day of discharge  I had direct contact with the patient on the day of discharge  Additional documentation is required if more than 30 minutes were spent on discharge       Discharge Medications:  See after visit summary for reconciled discharge medications provided to patient and family

## 2018-11-08 NOTE — PROGRESS NOTES
Progress Note - OB/GYN   Holli Iniguez 29 y o  female MRN: [id number]  Unit/Bed#: L&D 314-01 Encounter: [id number]    Holli Iniguez is a patient of Dr Dima Delgado:  Post partum day #1 s/p , stable, and doing well, would like to go home early today as this is her 2nd baby, delivered at 18 yesterday    Plan:  1  Rh negative   - Baby Rh negative, no need for RhoGAM  2  Hx of HSV   - No need for continued suppression; will treat prn  3  Continue routine post partum care   - Encourage ambulation   - Encourage breastfeeding  4  Continue current meds    - See list below   - Pain controlled  5  Disposition   - Stable, vitals WNL   - Anticipate discharge home today      Subjective/Objective     Chief Complaint:     Post partum day 1 from a  with no complaints today    Subjective:   Pain: no  Tolerating Oral Intake: yes  Voiding: yes  Flatus: yes  Bowel Movement: no  Ambulating: yes  Breastfeeding: Breastfeeding  Chest Pain: no  Shortness of Breath: no  Leg Pain/Discomfort: no  Lochia: minimal    Objective:   Vitals: /52 (BP Location: Right arm)   Pulse 64   Temp 98 5 °F (36 9 °C) (Oral)   Resp 17   Ht 5' 10" (1 778 m)   Wt 83 9 kg (185 lb)   LMP 2018 (Exact Date)   Breastfeeding?  Yes   BMI 26 54 kg/m²       Intake/Output Summary (Last 24 hours) at 18 0600  Last data filed at 18 1938   Gross per 24 hour   Intake             1300 ml   Output              950 ml   Net              350 ml       Lab Results   Component Value Date    WBC 11 61 (H) 2018    HGB 12 8 2018    HCT 36 9 2018    MCV 97 2018     2018       Meds/Allergies   Current Facility-Administered Medications   Medication Dose Route Frequency    acetaminophen (TYLENOL) tablet 650 mg  650 mg Oral Q6H PRN    benzocaine-menthol-lanolin-aloe (DERMOPLAST) 20-0 5 % topical spray   Topical 4x Daily PRN    calcium carbonate (TUMS) chewable tablet 1,000 mg  1,000 mg Oral Daily PRN  docusate sodium (COLACE) capsule 100 mg  100 mg Oral BID    ibuprofen (MOTRIN) tablet 600 mg  600 mg Oral Q6H PRN    ondansetron (ZOFRAN) injection 4 mg  4 mg Intravenous Q8H PRN    oxytocin (PITOCIN) 30 Units in lactated ringers 500 mL infusion  30 elsi-units/min Intravenous Continuous    Rho(D) immune globulin (RHOGAM ULTRA-FILTERED PLUS) IM injection 300 mcg  300 mcg Intramuscular Once       Physical Exam:  General: in no apparent distress, well developed and well nourished and non-toxic  Cardiovascular: Cor RRR  Lungs: clear to auscultation bilaterally  Abdomen: abdomen is soft without significant tenderness, masses, organomegaly or guarding  Fundus: Firm and non-tender, 1 cm below the umbilicus  Lower extremeties: nontender    Joseifna Garcia DO  PGY-2 OB/GYN   11/8/2018 6:00 AM

## 2018-11-15 LAB — PLACENTA IN STORAGE: NORMAL

## 2020-11-05 ENCOUNTER — TRANSCRIBE ORDERS (OUTPATIENT)
Dept: PERINATAL CARE | Facility: CLINIC | Age: 30
End: 2020-11-05

## 2020-11-05 DIAGNOSIS — O09.899 SUPERVISION OF OTHER HIGH RISK PREGNANCIES, UNSPECIFIED TRIMESTER: Primary | ICD-10-CM

## 2020-11-08 PROBLEM — O09.299 HISTORY OF FETAL ANOMALY IN PRIOR PREGNANCY, CURRENTLY PREGNANT: Status: ACTIVE | Noted: 2018-04-17

## 2020-11-09 ENCOUNTER — TELEPHONE (OUTPATIENT)
Dept: PERINATAL CARE | Facility: CLINIC | Age: 30
End: 2020-11-09

## 2020-11-10 ENCOUNTER — ULTRASOUND (OUTPATIENT)
Dept: PERINATAL CARE | Facility: OTHER | Age: 30
End: 2020-11-10
Payer: COMMERCIAL

## 2020-11-10 VITALS
DIASTOLIC BLOOD PRESSURE: 72 MMHG | TEMPERATURE: 97.8 F | HEART RATE: 57 BPM | BODY MASS INDEX: 23.08 KG/M2 | HEIGHT: 70 IN | SYSTOLIC BLOOD PRESSURE: 124 MMHG | WEIGHT: 161.2 LBS

## 2020-11-10 DIAGNOSIS — Z36.82 ENCOUNTER FOR ANTENATAL SCREENING FOR NUCHAL TRANSLUCENCY: ICD-10-CM

## 2020-11-10 DIAGNOSIS — O09.299 HISTORY OF FETAL ANOMALY IN PRIOR PREGNANCY, CURRENTLY PREGNANT: Primary | ICD-10-CM

## 2020-11-10 DIAGNOSIS — Z3A.09 9 WEEKS GESTATION OF PREGNANCY: ICD-10-CM

## 2020-11-10 PROCEDURE — 76801 OB US < 14 WKS SINGLE FETUS: CPT | Performed by: OBSTETRICS & GYNECOLOGY

## 2020-11-10 PROCEDURE — 99212 OFFICE O/P EST SF 10 MIN: CPT | Performed by: OBSTETRICS & GYNECOLOGY

## 2021-01-25 ENCOUNTER — ROUTINE PRENATAL (OUTPATIENT)
Dept: PERINATAL CARE | Facility: OTHER | Age: 31
End: 2021-01-25
Payer: COMMERCIAL

## 2021-01-25 VITALS
BODY MASS INDEX: 24.48 KG/M2 | HEART RATE: 82 BPM | WEIGHT: 171 LBS | SYSTOLIC BLOOD PRESSURE: 117 MMHG | DIASTOLIC BLOOD PRESSURE: 72 MMHG | HEIGHT: 70 IN

## 2021-01-25 DIAGNOSIS — Z36.3 ENCOUNTER FOR ANTENATAL SCREENING FOR MALFORMATIONS: Primary | ICD-10-CM

## 2021-01-25 DIAGNOSIS — Z36.86 ENCOUNTER FOR ANTENATAL SCREENING FOR CERVICAL LENGTH: ICD-10-CM

## 2021-01-25 DIAGNOSIS — O09.299 HISTORY OF FETAL ANOMALY IN PRIOR PREGNANCY, CURRENTLY PREGNANT: ICD-10-CM

## 2021-01-25 PROCEDURE — 76811 OB US DETAILED SNGL FETUS: CPT | Performed by: OBSTETRICS & GYNECOLOGY

## 2021-01-25 PROCEDURE — 99213 OFFICE O/P EST LOW 20 MIN: CPT | Performed by: OBSTETRICS & GYNECOLOGY

## 2021-01-25 PROCEDURE — 76817 TRANSVAGINAL US OBSTETRIC: CPT | Performed by: OBSTETRICS & GYNECOLOGY

## 2021-01-25 NOTE — PROGRESS NOTES
Ultrasound Probe Disinfection    A transvaginal ultrasound was performed  Prior to use, disinfection was performed with High Level Disinfection Process (Trophon)  Probe serial number F1: [id number] was used        Simone kSaggs  01/25/21  2:37 PM

## 2021-01-25 NOTE — LETTER
[address] Maternal-Fetal Medicine Physicians:  MD Dr Jessica Shelley MD Dr Gladis Grieves, MD Dr Kristy Single, MD Dr Silvestre Cress, MD Dr Dannielle Olive, MD      January 26, 2021     Patient: Donita Lora   YOB: 1990   Date of Visit: 1/25/2021       To Whom it May Concern:    Lita Don was seen in the above office on 1/25/2021  She is eligible to receive the COVID-19 vaccine! If you have any questions or concerns, please don't hesitate to call      Sincerely,          Willard Carrillo MD

## 2021-01-25 NOTE — PROGRESS NOTES
[address]: Ms Maryellen Azul was seen today at 20w1d for anatomic survey and cervical length screening ultrasound  See ultrasound report under "OB Procedures" tab  Please don't hesitate to contact our office with any concerns or questions    Leatha Kruse MD

## 2021-01-25 NOTE — PATIENT INSTRUCTIONS
Thank you for choosing us for your  care today  If you have any questions about your ultrasound or care, please do not hesitate to contact us or your primary obstetrician  Some general instructions for your pregnancy are:     Protect against coronavirus: Pregnant women are increased risk of severe COVID  Continue to practice social distancing, wear a mask, and wash your hands often  Because of the increased risk of pregnancy, you are advised to not attend or host inperson gatherings with people who do not currently live inside your household - this includes birthday parties, gender reveals, baby showers, etc)  Notify your primary care doctor if you have any symptoms including cough, shortness of breath or difficulty breathing, fever, chills, muscle pain, sore throat, or loss of taste or smell  Pregnant women can receive the coronavirus vaccine   Exercise: Aim for 22 minutes per day (150 minutes per week) of regular exercise  Walking is great!  Nutrition: aim for calcium-rich and iron-rich foods as well as healthy sources of protein   Protect against the flu: get yourself and your entire household vaccinated against influenza  This will protect your baby   Learn about Preeclampsia: preeclampsia is a common, serious high blood pressure complication in pregnancy  A blood pressure of 996VLYJ (systolic or top number) or 20QYAB (diastolic or bottom number) is not normal and needs evaluation by your doctor   If you smoke, try to reduce how many cigarettes you smoke or quit completely  Do not vape   Other warning signs to watch out for in pregnancy or postpartum: chest pain, obstructed breathing or shortness of breath, seizures, thoughts of hurting yourself or your baby, bleeding, a painful or swollen leg, fever, or headache (see [employer] POST-BIRTH Warning Signs campaign)  If these happen call 911    Itching is also not normal in pregnancy and if you experience this, especially over your hands and feet, potentially worse at night, notify your doctors   Lastly, if you are contacted regarding participation in a survey about your experience in our office, please know that we take any feedback you provide seriously and use it to improve how we deliver care through our center

## 2021-01-25 NOTE — LETTER
January 26, 2021     Ally Franklin  [address] [place] [address]    Patient: Radha Bullock   YOB: 1990   Date of Visit: 1/25/2021       Dear Dr Khadar Santillan:    Thank you for referring Erika Fay to me for evaluation  Below are my notes for this consultation  Consultation is contained in body of ultrasound report which has been faxed to you under separate cover; please contact us if you do not receive this  If you have questions, please do not hesitate to call me  I look forward to following your patient along with you  Sincerely,        Huseyin Nation MD        CC: No Recipients  Huseyin Nation MD  1/25/2021  3:47 PM  Sign when Signing Visit  [address]: Ms Cj Sheppard was seen today at 20w1d for anatomic survey and cervical length screening ultrasound  See ultrasound report under "OB Procedures" tab  Please don't hesitate to contact our office with any concerns or questions    Huseyin Nation MD

## 2021-01-26 ENCOUNTER — TELEPHONE (OUTPATIENT)
Dept: PERINATAL CARE | Facility: OTHER | Age: 31
End: 2021-01-26

## 2021-01-26 NOTE — TELEPHONE ENCOUNTER
Left message for pt to farheen her 8-12w followup for growth and mfm fetal echo 2-4 wks , pt normally goes to [facility]

## 2021-02-08 ENCOUNTER — TELEPHONE (OUTPATIENT)
Dept: PERINATAL CARE | Facility: OTHER | Age: 31
End: 2021-02-08

## 2021-02-08 NOTE — TELEPHONE ENCOUNTER
-------------------------------------------------------------    Attempted to reach patient by phone and left voicemail to confirm appointment for MFM ultrasound  1 support person ( must be over the age of 15) may accompany you for your appointment  If you or your support person have traveled outside the state in the past 2 weeks, please call and notify our office today #[phone]  You and your support person must wear a mask ,covering nose and mouth,during your entire visit  To minimize your exposure in our waiting room, please call our office prior to entering the building  Check in and rooming questions will be done via phone  We will give you directions when to enter for your appointment  [place]: [phone]    IF you are not feeling well- cough, fever, shortness of breath or any flu like symptoms, contact your primary care physician or [address] [place] [address]  If you are awaiting COVID 19 test results please call and reschedule your appointment    Any questions with these instructions please call Maternal Fetal Medicine nurse line today @ # [phone]

## 2021-02-09 ENCOUNTER — ROUTINE PRENATAL (OUTPATIENT)
Dept: PERINATAL CARE | Facility: OTHER | Age: 31
End: 2021-02-09
Payer: COMMERCIAL

## 2021-02-09 VITALS
DIASTOLIC BLOOD PRESSURE: 65 MMHG | SYSTOLIC BLOOD PRESSURE: 100 MMHG | WEIGHT: 179 LBS | BODY MASS INDEX: 25.62 KG/M2 | HEIGHT: 70 IN | HEART RATE: 59 BPM

## 2021-02-09 DIAGNOSIS — Z3A.22 22 WEEKS GESTATION OF PREGNANCY: ICD-10-CM

## 2021-02-09 DIAGNOSIS — O09.299 HISTORY OF FETAL ANOMALY IN PRIOR PREGNANCY, CURRENTLY PREGNANT: Primary | ICD-10-CM

## 2021-02-09 PROCEDURE — 76825 ECHO EXAM OF FETAL HEART: CPT | Performed by: OBSTETRICS & GYNECOLOGY

## 2021-02-09 PROCEDURE — 76827 ECHO EXAM OF FETAL HEART: CPT | Performed by: OBSTETRICS & GYNECOLOGY

## 2021-02-09 PROCEDURE — 93325 DOPPLER ECHO COLOR FLOW MAPG: CPT | Performed by: OBSTETRICS & GYNECOLOGY

## 2021-02-09 NOTE — LETTER
February 9, 2021     Ally Schroeder  [address]    Patient: Prince Razo   YOB: 1990   Date of Visit: 2/9/2021       Dear Dr Leticia Castaneda:    Thank you for referring Fernando Mao to me for evaluation  Below are my notes for this consultation  If you have questions, please do not hesitate to call me  I look forward to following your patient along with you  Sincerely,        Oralia Luna MD        CC: No Recipients  Oralia Luna MD  2/9/2021  4:04 PM  Sign when Signing Visit  Prince Razo  has no complaints today  She reports fetal movements  She is here for a fetal echo because a prior son had a VSD found antenatally that closed prior to birth  Ultrasound findings: The ultrasound today shows  A normal fetal echo and the prior missed anatomy of the cervical and thoracic spine appeared normal thus completing her anatomical survey  Pregnancy ultrasound has limitations and is unable to detect all forms of fetal congenital abnormalities  No further follow-up ultrasounds are recommended at this time      Oralia Luna MD

## 2021-02-09 NOTE — PROGRESS NOTES
Beth Tristan  has no complaints today  She reports fetal movements  She is here for a fetal echo because a prior son had a VSD found antenatally that closed prior to birth  Ultrasound findings: The ultrasound today shows  A normal fetal echo and the prior missed anatomy of the cervical and thoracic spine appeared normal thus completing her anatomical survey  Pregnancy ultrasound has limitations and is unable to detect all forms of fetal congenital abnormalities  No further follow-up ultrasounds are recommended at this time      Aidee Merida MD

## 2021-06-18 ENCOUNTER — ULTRASOUND (OUTPATIENT)
Dept: PERINATAL CARE | Facility: CLINIC | Age: 31
End: 2021-06-18
Payer: COMMERCIAL

## 2021-06-18 ENCOUNTER — ROUTINE PRENATAL (OUTPATIENT)
Dept: PERINATAL CARE | Facility: CLINIC | Age: 31
End: 2021-06-18
Payer: COMMERCIAL

## 2021-06-18 VITALS
BODY MASS INDEX: 27.54 KG/M2 | SYSTOLIC BLOOD PRESSURE: 113 MMHG | HEART RATE: 71 BPM | WEIGHT: 192.4 LBS | HEIGHT: 70 IN | DIASTOLIC BLOOD PRESSURE: 66 MMHG

## 2021-06-18 DIAGNOSIS — O48.0 POST-TERM PREGNANCY, 40-42 WEEKS OF GESTATION: Primary | ICD-10-CM

## 2021-06-18 DIAGNOSIS — Z3A.40 40 WEEKS GESTATION OF PREGNANCY: ICD-10-CM

## 2021-06-18 DIAGNOSIS — O48.0 POST-TERM PREGNANCY, 40-42 WEEKS OF GESTATION: ICD-10-CM

## 2021-06-18 DIAGNOSIS — O09.899 SUPERVISION OF OTHER HIGH RISK PREGNANCIES, UNSPECIFIED TRIMESTER: ICD-10-CM

## 2021-06-18 PROCEDURE — 76815 OB US LIMITED FETUS(S): CPT | Performed by: OBSTETRICS & GYNECOLOGY

## 2021-06-18 PROCEDURE — 59025 FETAL NON-STRESS TEST: CPT | Performed by: OBSTETRICS & GYNECOLOGY

## 2021-06-18 PROCEDURE — NC001 PR NO CHARGE

## 2021-06-18 RX ORDER — VALACYCLOVIR HYDROCHLORIDE 500 MG/1
500 TABLET, FILM COATED ORAL DAILY
COMMUNITY
End: 2021-06-23 | Stop reason: HOSPADM

## 2021-06-18 NOTE — LETTER
NST sleeve cover sheet    Patient name: Thomas Aleman  : 1990  MRN: [id number]    LITO: Estimated Date of Delivery: 21    Obstetrician: Sarbjit Flores    Reason(s) for testing:  Post dates  __________________________________________      Testing frequency:    ___ 2x/wk  ___ 1x/wk  ___ Dopplers  ___ BPP?       Last growth scan: __________________________________________

## 2021-06-18 NOTE — PATIENT INSTRUCTIONS
Kick Counts in Pregnancy   AMBULATORY CARE:   Kick counts  measure how much your baby is moving in your womb  A kick from your baby can be felt as a twist, turn, swish, roll, or jab  It is common to feel your baby kicking at 26 to 28 weeks of pregnancy  You may feel your baby kick as early as 20 weeks of pregnancy  You may want to start counting at 28 weeks  Contact your healthcare provider immediately if:   · You feel a change in the number of kicks or movements of your baby  · You feel fewer than 10 kicks within 2 hours  · You have questions or concerns about your baby's movements  Why measure kick counts:  Your baby's movement may provide information about your baby's health  He or she may move less, or not at all, if there are problems  Your baby may move less if he or she is not getting enough oxygen or nutrition from the placenta  Do not smoke while you are pregnant  Smoking decreases the amount of oxygen that gets to your baby  Talk to your healthcare provider if you need help to quit smoking  Tell your healthcare provider as soon as you feel a change in your baby's movements  When to measure kick counts:   · Measure kick counts at the same time every day  · Measure kick counts when your baby is awake and most active  Your baby may be most active in the evening  How to measure kick counts:  Check that your baby is awake before you measure kick counts  You can wake up your baby by lightly pushing on your belly, walking, or drinking something cold  Your healthcare provider may tell you different ways to measure kick counts  You may be told to do the following:  · Use a chart or clock to keep track of the time you start and finish counting  · Sit in a chair or lie on your left side  · Place your hands on the largest part of your belly  · Count until you reach 10 kicks  Write down how much time it takes to count 10 kicks  · It may take 30 minutes to 2 hours to count 10 kicks  It should not take more than 2 hours to count 10 kicks  Follow up with your healthcare provider as directed:  Write down your questions so you remember to ask them during your visits  © Copyright [employer] Information is for End User's use only and may not be sold, redistributed or otherwise used for commercial purposes  All illustrations and images included in CareNotes® are the copyrighted property of A D A M , Inc  or [facility]   The above information is an  only  It is not intended as medical advice for individual conditions or treatments  Talk to your doctor, nurse or pharmacist before following any medical regimen to see if it is safe and effective for you

## 2021-06-18 NOTE — PROGRESS NOTES
Non-Stress Testing:    Non-Stress test, equipment, procedure, and expected outcomes reviewed  Reviewed fetal kick counts and when to call OB  Jessica Duenas Verified patient understanding of fetal kick counts with teach back method  Patient reports feeling daily fetal movements  Patient has no questions or concerns

## 2021-06-22 ENCOUNTER — ANESTHESIA (INPATIENT)
Dept: ANESTHESIOLOGY | Facility: HOSPITAL | Age: 31
End: 2021-06-22
Payer: COMMERCIAL

## 2021-06-22 ENCOUNTER — HOSPITAL ENCOUNTER (OUTPATIENT)
Dept: LABOR AND DELIVERY | Facility: HOSPITAL | Age: 31
Discharge: HOME | End: 2021-06-22
Payer: COMMERCIAL

## 2021-06-22 ENCOUNTER — HOSPITAL ENCOUNTER (INPATIENT)
Facility: HOSPITAL | Age: 31
LOS: 1 days | Discharge: HOME | End: 2021-06-23
Attending: OBSTETRICS & GYNECOLOGY | Admitting: OBSTETRICS & GYNECOLOGY
Payer: COMMERCIAL

## 2021-06-22 ENCOUNTER — ANESTHESIA EVENT (INPATIENT)
Dept: ANESTHESIOLOGY | Facility: HOSPITAL | Age: 31
End: 2021-06-22
Payer: COMMERCIAL

## 2021-06-22 DIAGNOSIS — Z34.90 PREGNANCY: ICD-10-CM

## 2021-06-22 PROBLEM — Z3A.41 41 WEEKS GESTATION OF PREGNANCY: Status: ACTIVE | Noted: 2021-06-22

## 2021-06-22 PROBLEM — O48.0 41 WEEKS GESTATION OF PREGNANCY: Status: ACTIVE | Noted: 2021-06-22

## 2021-06-22 PROBLEM — O98.513 HSV-2 INFECTION COMPLICATING PREGNANCY, THIRD TRIMESTER: Status: ACTIVE | Noted: 2021-06-22

## 2021-06-22 PROBLEM — O48.0 POST-TERM PREGNANCY, 40-42 WEEKS OF GESTATION: Status: ACTIVE | Noted: 2021-06-22

## 2021-06-22 PROBLEM — B00.9 HSV-2 INFECTION COMPLICATING PREGNANCY, THIRD TRIMESTER: Status: ACTIVE | Noted: 2021-06-22

## 2021-06-22 LAB
ABO GROUP BLD: NORMAL
BASE EXCESS BLDCOV CALC-SCNC: -2.7 MMOL/L (ref 1–9)
BLD GP AB SCN SERPL QL: NEGATIVE
BLD GP AB SCN SERPL QL: NEGATIVE
ERYTHROCYTE [DISTWIDTH] IN BLOOD BY AUTOMATED COUNT: 12.9 % (ref 11.6–15.1)
FETAL CELL SCN BLD QL ROSETTE: NEGATIVE
HCO3 BLDCOV-SCNC: 22.1 MMOL/L (ref 12.2–28.6)
HCT VFR BLD AUTO: 36.7 % (ref 34.8–46.1)
HGB BLD-MCNC: 12.4 G/DL (ref 11.5–15.4)
MCH RBC QN AUTO: 32.8 PG (ref 26.8–34.3)
MCHC RBC AUTO-ENTMCNC: 33.8 G/DL (ref 31.4–37.4)
MCV RBC AUTO: 97 FL (ref 82–98)
OXYHGB MFR BLDCOV: 78.7 %
PCO2 BLDCOV: 38.7 MM HG (ref 27–43)
PH BLDCOV: 7.37 [PH] (ref 7.19–7.49)
PLATELET # BLD AUTO: 188 THOUSANDS/UL (ref 149–390)
PMV BLD AUTO: 9.5 FL (ref 8.9–12.7)
PO2 BLDCOV: 36.8 MM HG (ref 15–45)
RBC # BLD AUTO: 3.78 MILLION/UL (ref 3.81–5.12)
RH BLD: NEGATIVE
RPR SER QL: NORMAL
SAO2 % BLDCOV: 19 ML/DL
SPECIMEN EXPIRATION DATE: NORMAL
WBC # BLD AUTO: 11.45 THOUSAND/UL (ref 4.31–10.16)

## 2021-06-22 PROCEDURE — 82805 BLOOD GASES W/O2 SATURATION: CPT | Performed by: OBSTETRICS & GYNECOLOGY

## 2021-06-22 PROCEDURE — 86850 RBC ANTIBODY SCREEN: CPT | Performed by: OBSTETRICS & GYNECOLOGY

## 2021-06-22 PROCEDURE — 86900 BLOOD TYPING SEROLOGIC ABO: CPT | Performed by: OBSTETRICS & GYNECOLOGY

## 2021-06-22 PROCEDURE — 85027 COMPLETE CBC AUTOMATED: CPT | Performed by: OBSTETRICS & GYNECOLOGY

## 2021-06-22 PROCEDURE — 4A1HXCZ MONITORING OF PRODUCTS OF CONCEPTION, CARDIAC RATE, EXTERNAL APPROACH: ICD-10-PCS | Performed by: OBSTETRICS & GYNECOLOGY

## 2021-06-22 PROCEDURE — 3E033VJ INTRODUCTION OF OTHER HORMONE INTO PERIPHERAL VEIN, PERCUTANEOUS APPROACH: ICD-10-PCS | Performed by: OBSTETRICS & GYNECOLOGY

## 2021-06-22 PROCEDURE — 10H07YZ INSERTION OF OTHER DEVICE INTO PRODUCTS OF CONCEPTION, VIA NATURAL OR ARTIFICIAL OPENING: ICD-10-PCS | Performed by: OBSTETRICS & GYNECOLOGY

## 2021-06-22 PROCEDURE — 86901 BLOOD TYPING SEROLOGIC RH(D): CPT | Performed by: OBSTETRICS & GYNECOLOGY

## 2021-06-22 PROCEDURE — 85461 HEMOGLOBIN FETAL: CPT | Performed by: OBSTETRICS & GYNECOLOGY

## 2021-06-22 PROCEDURE — 10H073Z INSERTION OF MONITORING ELECTRODE INTO PRODUCTS OF CONCEPTION, VIA NATURAL OR ARTIFICIAL OPENING: ICD-10-PCS | Performed by: OBSTETRICS & GYNECOLOGY

## 2021-06-22 PROCEDURE — 10907ZC DRAINAGE OF AMNIOTIC FLUID, THERAPEUTIC FROM PRODUCTS OF CONCEPTION, VIA NATURAL OR ARTIFICIAL OPENING: ICD-10-PCS | Performed by: OBSTETRICS & GYNECOLOGY

## 2021-06-22 PROCEDURE — 86592 SYPHILIS TEST NON-TREP QUAL: CPT | Performed by: OBSTETRICS & GYNECOLOGY

## 2021-06-22 RX ORDER — ROPIVACAINE HYDROCHLORIDE 2 MG/ML
INJECTION, SOLUTION EPIDURAL; INFILTRATION; PERINEURAL CONTINUOUS PRN
Status: DISCONTINUED | OUTPATIENT
Start: 2021-06-22 | End: 2021-06-22 | Stop reason: HOSPADM

## 2021-06-22 RX ORDER — VALACYCLOVIR HYDROCHLORIDE 500 MG/1
500 TABLET, FILM COATED ORAL DAILY
Status: DISCONTINUED | OUTPATIENT
Start: 2021-06-22 | End: 2021-06-22

## 2021-06-22 RX ORDER — IBUPROFEN 600 MG/1
600 TABLET ORAL EVERY 6 HOURS PRN
Status: DISCONTINUED | OUTPATIENT
Start: 2021-06-22 | End: 2021-06-23 | Stop reason: HOSPADM

## 2021-06-22 RX ORDER — ONDANSETRON 2 MG/ML
4 INJECTION INTRAMUSCULAR; INTRAVENOUS EVERY 6 HOURS PRN
Status: DISCONTINUED | OUTPATIENT
Start: 2021-06-22 | End: 2021-06-22

## 2021-06-22 RX ORDER — SODIUM CHLORIDE, SODIUM LACTATE, POTASSIUM CHLORIDE, CALCIUM CHLORIDE 600; 310; 30; 20 MG/100ML; MG/100ML; MG/100ML; MG/100ML
125 INJECTION, SOLUTION INTRAVENOUS CONTINUOUS
Status: DISCONTINUED | OUTPATIENT
Start: 2021-06-22 | End: 2021-06-22

## 2021-06-22 RX ORDER — DIAPER,BRIEF,INFANT-TODD,DISP
1 EACH MISCELLANEOUS 4 TIMES DAILY PRN
Status: DISCONTINUED | OUTPATIENT
Start: 2021-06-22 | End: 2021-06-23 | Stop reason: HOSPADM

## 2021-06-22 RX ORDER — ROPIVACAINE HYDROCHLORIDE 5 MG/ML
INJECTION, SOLUTION EPIDURAL; INFILTRATION; PERINEURAL AS NEEDED
Status: DISCONTINUED | OUTPATIENT
Start: 2021-06-22 | End: 2021-06-22 | Stop reason: HOSPADM

## 2021-06-22 RX ORDER — DIPHENHYDRAMINE HYDROCHLORIDE 50 MG/ML
25 INJECTION INTRAMUSCULAR; INTRAVENOUS EVERY 6 HOURS PRN
Status: DISCONTINUED | OUTPATIENT
Start: 2021-06-22 | End: 2021-06-23 | Stop reason: HOSPADM

## 2021-06-22 RX ORDER — OXYTOCIN/RINGER'S LACTATE 30/500 ML
1-30 PLASTIC BAG, INJECTION (ML) INTRAVENOUS
Status: DISCONTINUED | OUTPATIENT
Start: 2021-06-22 | End: 2021-06-22

## 2021-06-22 RX ORDER — DIPHENHYDRAMINE HYDROCHLORIDE 50 MG/ML
25 INJECTION INTRAMUSCULAR; INTRAVENOUS EVERY 6 HOURS PRN
Status: DISCONTINUED | OUTPATIENT
Start: 2021-06-22 | End: 2021-06-22

## 2021-06-22 RX ORDER — LIDOCAINE HYDROCHLORIDE AND EPINEPHRINE 15; 5 MG/ML; UG/ML
INJECTION, SOLUTION EPIDURAL AS NEEDED
Status: DISCONTINUED | OUTPATIENT
Start: 2021-06-22 | End: 2021-06-22 | Stop reason: HOSPADM

## 2021-06-22 RX ORDER — DOCUSATE SODIUM 100 MG/1
100 CAPSULE, LIQUID FILLED ORAL 2 TIMES DAILY
Status: DISCONTINUED | OUTPATIENT
Start: 2021-06-22 | End: 2021-06-23 | Stop reason: HOSPADM

## 2021-06-22 RX ORDER — ACETAMINOPHEN 325 MG/1
650 TABLET ORAL EVERY 6 HOURS PRN
Status: DISCONTINUED | OUTPATIENT
Start: 2021-06-22 | End: 2021-06-23 | Stop reason: HOSPADM

## 2021-06-22 RX ADMIN — SODIUM CHLORIDE, SODIUM LACTATE, POTASSIUM CHLORIDE, AND CALCIUM CHLORIDE 999 ML/HR: .6; .31; .03; .02 INJECTION, SOLUTION INTRAVENOUS at 07:54

## 2021-06-22 RX ADMIN — ROPIVACAINE HYDROCHLORIDE: 2 INJECTION, SOLUTION EPIDURAL; INFILTRATION at 11:13

## 2021-06-22 RX ADMIN — BENZOCAINE AND LEVOMENTHOL: 200; 5 SPRAY TOPICAL at 17:54

## 2021-06-22 RX ADMIN — DOCUSATE SODIUM 100 MG: 100 CAPSULE ORAL at 17:06

## 2021-06-22 RX ADMIN — ROPIVACAINE HYDROCHLORIDE 5 ML: 5 INJECTION, SOLUTION EPIDURAL; INFILTRATION; PERINEURAL at 11:01

## 2021-06-22 RX ADMIN — LIDOCAINE HYDROCHLORIDE AND EPINEPHRINE 5 ML: 15; 5 INJECTION, SOLUTION EPIDURAL at 11:01

## 2021-06-22 RX ADMIN — SODIUM CHLORIDE, SODIUM LACTATE, POTASSIUM CHLORIDE, AND CALCIUM CHLORIDE 125 ML/HR: .6; .31; .03; .02 INJECTION, SOLUTION INTRAVENOUS at 13:05

## 2021-06-22 RX ADMIN — HUMAN RHO(D) IMMUNE GLOBULIN 300 MCG: 300 INJECTION, SOLUTION INTRAMUSCULAR at 21:47

## 2021-06-22 RX ADMIN — ROPIVACAINE HYDROCHLORIDE 10 ML/HR: 2 INJECTION, SOLUTION EPIDURAL; INFILTRATION at 11:09

## 2021-06-22 RX ADMIN — ROPIVACAINE HYDROCHLORIDE 3 ML: 5 INJECTION, SOLUTION EPIDURAL; INFILTRATION; PERINEURAL at 11:09

## 2021-06-22 RX ADMIN — Medication 2 MILLI-UNITS/MIN: at 11:24

## 2021-06-22 RX ADMIN — IBUPROFEN 600 MG: 600 TABLET, FILM COATED ORAL at 17:54

## 2021-06-22 NOTE — ANESTHESIA PREPROCEDURE EVALUATION
Procedure:  LABOR ANALGESIA    Relevant Problems   GYN   (+) 41 weeks gestation of pregnancy        Physical Exam    Airway    Mallampati score: II  TM Distance: >3 FB  Neck ROM: full     Dental   No notable dental hx     Cardiovascular  Cardiovascular exam normal    Pulmonary  Pulmonary exam normal     Other Findings        Anesthesia Plan  ASA Score- 2     Anesthesia Type- epidural with ASA Monitors  Additional Monitors:   Airway Plan:           Plan Factors-Exercise tolerance (METS): >4 METS  Chart reviewed  Existing labs reviewed  Patient summary reviewed  Patient is not a current smoker  Induction-     Postoperative Plan-     Informed Consent- Anesthetic plan and risks discussed with patient and spouse

## 2021-06-22 NOTE — OB LABOR/OXYTOCIN SAFETY PROGRESS
Labor Progress Note - Lanie Vázquez 32 y o  female MRN: [id number]    Unit/Bed#: L&D 323-01 Encounter: [id number]       Contraction Frequency (minutes): 2-4  Contraction Quality: Moderate      Cervical Dilation: 5        Cervical Effacement: 80  Fetal Station: -1  Baseline Rate: 120 bpm  Fetal Heart Rate: 130 BPM  FHR Category: Category I               Vital Signs:   Vitals:    21 0645   BP: 132/78   Pulse: 81   Resp: 18   Temp: 98 7 °F (37 1 °C)           Notes/comments:    Pt appears comfortable, states ctx becoming more intense, requests epidural   Ctx still somewhat irregular  Will plan IVF bolus, epidural and repeat exam   May need pitocin if ctx still spaced out  Pt in agreement  Expect SHAY Gregorio MD 2021 10:47 AM

## 2021-06-22 NOTE — OB LABOR/OXYTOCIN SAFETY PROGRESS
Oxytocin Safety Progress Check Note - Kimberly Chatterjee 32 y o  female MRN: [id number]    Unit/Bed#: L&D 323-01 Encounter: [id number]    Dose (elsi-units/min) Oxytocin: 2 elsi-units/min  Contraction Frequency (minutes): n/a  Contraction Quality: Not applicable  Tachysystole: No   Cervical Dilation: 5        Cervical Effacement: 70  Fetal Station: -1  Baseline Rate: 120 bpm  Fetal Heart Rate: 130 BPM  FHR Category: Category I               Vital Signs:   Vitals:    06/22/21 1124   BP:    Pulse:    Resp:    Temp: 97 7 °F (36 5 °C)           Notes/comments: Patient had a deceleration to the 90s after pitocin was started for 3 minutes  Resolved spontaneously  FHT back to baseline  Will continue to monitor  Dr Ej Diego aware          Tayla Allison MD 6/22/2021 11:42 AM

## 2021-06-22 NOTE — L&D DELIVERY NOTE
Vaginal Delivery Summary - OB/GYN   Ju Lora 32 y o  female MRN: [id number]  Unit/Bed#: L&D 323-01 Encounter: [id number]          Pre-delivery Diagnosis:   1  Pregnancy at 41 weeks   2  History of 2 prior term vaginal deliveries  3  Rh negative   4  H/o fetus with VSD  5  H/o bipolar disorder   6  Post-term pregnancy   7  H/o HSV    Post-delivery Diagnosis: same, delivered live male infant    Procedure: Spontaneous Vaginal Delivery     Attending: Dr Monse Navarro     Assistant(s): Dr Landon Renteria     Anesthesia: Epidural    Anesthesiologist: Dr Moreira [place]: [id number]    Complications: none apparent    Specimens:   1  Arterial and venous cord gases  2  Cord blood  3  Segment of umbilical cord  4  Placenta to storage     Findings:  1  Viable male on 2021 at 1321, with APGARS of 9 and 9 at 1 and 5 minutes respectively,  2  Spontaneous delivery of intact placenta at 1326  3  Left labial abrasion that required repair with 3-0 Vicryl on SH    Gases:  Umbilical Cord Venous Blood Gas:  Results from last 7 days   Lab Units 21  1324   PH COV  7 374   PCO2 COV mm HG 38 7   HCO3 COV mmol/L 22 1   BASE EXC COV mmol/L -2 7*   O2 CT CD VB mL/dL 19 0   O2 HGB, VENOUS CORD % 55 1     Umbilical Cord Arterial Blood Gas:        Disposition:  Patient tolerated the procedure well and was recovering in labor and delivery room       Brief history and labor course:  Ms Mckayla Kemp is a 32 y o   at 43wk2d  She presented to labor and delivery for induction  Her pregnancy was complicated by the above  On exam upon admission she was noted to be 3/70/-2  She was admitted for induction with amniotomy  After a few hours she received an epidural and cervix was 5/70/-1  An IUPC was placed to better trace her irregular contractions and she was started on pitocin titration  After contractions became every 4 min, a few hours later, she progressed to complete dilation and started to push       Description of procedure:    After pushing for 6 minutes, at 1321 patient delivered a viable male , wt pending, apgars of 9 (1 min) and 9 (5 min)  The fetal vertex delivered spontaneously  There was a loose nuchal cord that was easily reduced  The right anterior shoulder delivered atraumatically with maternal expulsive forces and the assistance of gentle downward traction  The left posterior shoulder delivered with maternal expulsive forces and the assistance of gentle upward traction  There was a cord wrapped around the left upper extremity and trunk  It was reduced after delivery  The remainder of the fetus delivered spontaneously  Upon delivery, the infant was placed on the mothers abdomen and, after 60 seconds delay, the cord was doubly clamped and cut  The infant was noted to cry spontaneously and was moving all extremities appropriately  There was no evidence for injury  Awaiting nurse resuscitators evaluated the   Arterial and venous cord blood gases and cord blood was collected for analysis  These were promptly sent to the lab  In the immediate post-partum, 30 units of IV pitocin was administered, and the uterus was noted to contract down well with massage and pitocin  The placenta delivered spontaneously at 1326 and was noted to have a centrally inserted 3 vessel cord  The vagina, cervix, perineum, and rectum were inspected and there was noted to be a left labia minora abrasion that required repair for hemostasis  Laceration Repair  Patient was comfortable with epidural at that time  A left labial abrasion was identified and required repair  Laceration was repaired with 3-0 Vicryl on SH in running stitches to reapproximate the laceration  Good hemostasis was confirmed at the conclusion of this procedure  At the conclusion of the procedure, all needle, sponge, and instrument counts were noted to be correct   Patient tolerated the procedure well and was allowed to recover in labor and delivery room with family and  before being transferred to the post-partum floor  Dr Sami Zhang was present and participated in all key portions of the case        Sanjay Rincon MD  OB/GYN PGY-1  2021  2:50 PM

## 2021-06-22 NOTE — DISCHARGE INSTRUCTIONS
Vaginal Delivery   WHAT YOU SHOULD KNOW:   A vaginal delivery is the birth of your baby through your vagina (birth canal)  AFTER YOU LEAVE:   Medicines:  · NSAIDs  help decrease swelling and pain or fever  This medicine is available with or without a doctor's order  NSAIDs can cause stomach bleeding or kidney problems in certain people  If you take blood thinner medicine, always ask your healthcare provider if NSAIDs are safe for you  Always read the medicine label and follow directions  · Take your medicine as directed  Call your healthcare provider if you think your medicine is not helping or if you have side effects  Tell him if you are allergic to any medicine  Keep a list of the medicines, vitamins, and herbs you take  Include the amounts, and when and why you take them  Bring the list or the pill bottles to follow-up visits  Carry your medicine list with you in case of an emergency  Follow up with your primary healthcare provider:  Most women need to return 6 weeks after a vaginal delivery  Ask about how to care for your wounds or stitches  Write down your questions so you remember to ask them during your visits  Activity:  Rest as much as possible  Try to keep all activities short  You may be able to do some exercise soon after you have your baby  Talk with your primary healthcare provider before you start exercising  If you work outside the home, ask when you can return to your job  Kegel exercises:  Kegel exercises may help your vaginal and rectal muscles heal faster  You can do Kegel exercises by tightening and relaxing the muscles around your vagina  Kegel exercises help make the muscles stronger  Breast care:  When your milk comes in, your breasts may feel full and hard  Ask how to care for your breasts, even if you are not breastfeeding  Constipation:  Do not try to push the bowel movement out if it is too hard   High-fiber foods, extra liquids, and regular exercise can help you prevent constipation  Examples of high-fiber foods are fruit and bran  Prune juice and water are good liquids to drink  Regular exercise helps your digestive system work  You may also be told to take over-the-counter fiber and stool softener medicines  Take these items as directed  Hemorrhoids:  Pregnancy can cause severe hemorrhoids  You may have rectal pain because of the hemorrhoids  Ask how to prevent or treat hemorrhoids  Perineum care: Your perineum is the area between your vagina and anus  Keep the area clean and dry to help it heal and to prevent infection  Wash the area gently with soap and water when you bathe or shower  Rinse your perineum with warm water when you use the toilet  Your primary healthcare provider may suggest you use a warm sitz bath to help decrease pain  A sitz bath is a bathtub or basin filled to hip level  Stay in the sitz bath for 20 to 30 minutes, or as directed  Vaginal discharge: You will have vaginal discharge, called lochia, after your delivery  The lochia is bright red the first day or two after the birth  By the fourth day, the amount decreases, and it turns red-brown  Use a sanitary pad rather than a tampon to prevent a vaginal infection  It is normal to have lochia up to 8 weeks after your baby is born  Monthly periods: Your period may start again within 7 to 12 weeks after your baby is born  If you are breastfeeding, it may take longer for your period to start again  You can still get pregnant again even though you do not have your monthly period  Talk with your primary healthcare provider about a birth control method that will be good for you if you do not want to get pregnant  Mood changes: Many new mothers have some kind of mood changes after delivery  Some of these changes occur because of lack of sleep, hormone changes, and caring for a new baby  Some mood changes can be more serious, such as postpartum depression   Talk with your primary healthcare provider if you feel unable to care for yourself or your baby  Sexual activity:  You may need to avoid sex for 6 to 7 weeks after you have your baby  You may notice you have a decreased desire for sex, or sex may be painful  You may need to use a vaginal lubricant (gel) to help make sex more comfortable  Contact your primary healthcare provider if:   · You have heavy vaginal bleeding that fills 1 or more sanitary pads in 1 hour  · You have a fever  · Your pain does not go away, or gets worse  · The skin between your vagina and rectum is swollen, warm, or red  · You have swollen, hard, or painful breasts  · You feel very sad or depressed  · You feel more tired than usual      · You have questions or concerns about your condition or care  Seek care immediately or call 911 if:   · You have pus or yellow drainage coming from your vagina or wound  · You are urinating very little, or not at all  · Your arm or leg feels warm, tender, and painful  It may look swollen and red  · You feel lightheaded, have sudden and worsening chest pain, or trouble breathing  You may have more pain when you take deep breaths or cough, or you may cough up blood  © 2014 [address] is for End User's use only and may not be sold, redistributed or otherwise used for commercial purposes  All illustrations and images included in CareNotes® are the copyrighted property of [employer] , [employer]  or [employer]  The above information is an  only  It is not intended as medical advice for individual conditions or treatments  Talk to your doctor, nurse or pharmacist before following any medical regimen to see if it is safe and effective for you

## 2021-06-22 NOTE — OB LABOR/OXYTOCIN SAFETY PROGRESS
Oxytocin Safety Progress Check Note - Genesis Lora 32 y o  female MRN: [id number]    Unit/Bed#: L&D 323-01 Encounter: [id number]    Dose (elsi-units/min) Oxytocin: 8 elsi-units/min  Contraction Frequency (minutes): 2-4  Contraction Quality: Moderate  Tachysystole: No   Cervical Dilation: 10  Dilation Complete Date: 06/22/21  Dilation Complete Time: 0112  Cervical Effacement: 100  Fetal Station: 1  Baseline Rate: 125 bpm  Fetal Heart Rate: 130 BPM  FHR Category: Category I               Vital Signs:   Vitals:    06/22/21 1300   BP: 111/62   Pulse: 62   Resp:    Temp:            Notes/comments: SVE as above  FHT demonstrated decel  Patient is complete and will start pushing  Dr Thalia Guadalupe MD 6/22/2021 1:13 PM

## 2021-06-22 NOTE — H&P
H&P Exam - Obstetrics   [facility] 32 y o  female MRN: [id number]  Unit/Bed#: L&D 323-01 Encounter: [id number]      History of Present Illness     Chief Complaint: Induction of labor    HPI:  Abbi Brownlee is a 32 y o   female with an LITO of 2021, by Ultrasound at 41w2d weeks gestation who is being admitted for IOL for postdates  Pregnancy complicated by VSD in previous pregnancy with normal fetal echocardiogram in this pregnancy, history of herpes simplex virus on Valtrex suppression last outbreak more than 1 year ago  Rh negative  GBS negative    Contractions: no  Loss of fluid: no  Vaginal bleeding: no  Fetal movement: yes    She is Paras patient  PREGNANCY COMPLICATIONS:   1) history of cardiac defect in previous pregnancy  2) herpes simplex virus history  3) Rh negative    OB History    Para Term  AB Living   3 2 2 0 0 2   SAB TAB Ectopic Multiple Live Births         0 2      # Outcome Date GA Lbr Pedro/2nd Weight Sex Delivery Anes PTL Lv   3 Current            2 Term 18 41w3d 04:40 / 00:43 3374 g (7 lb 7 oz) M Vag-Spont None N ALISON   1 Term 10/26/16 41w0d  3289 g (7 lb 4 oz) M Vag-Spont EPI N ALISON       Baby complications/comments: normal echo    Review of Systems   Constitutional: Negative  HENT: Negative  Eyes: Negative  Respiratory: Negative  Cardiovascular: Negative  Gastrointestinal: Negative  Endocrine: Negative  Genitourinary: Negative  Musculoskeletal: Negative  Allergic/Immunologic: Negative  Neurological: Negative  Hematological: Negative  Psychiatric/Behavioral: Negative            Historical Information   Past Medical History:   Diagnosis Date    Anemia     1 episode    Back problem 2018    did physical therapy earlier this year    Cystic fibrosis carrier     Herpes     Seasonal allergies     Varicella      Past Surgical History:   Procedure Laterality Date    NASAL SEPTUM SURGERY      REFRACTIVE SURGERY  2012     Social History   Social History     Substance and Sexual Activity   Alcohol Use No     Social History     Substance and Sexual Activity   Drug Use No     Social History     Tobacco Use   Smoking Status Never Smoker   Smokeless Tobacco Never Used     Family History: non-contributory    Meds/Allergies      Medications Prior to Admission   Medication    Prenatal Multivit-Min-Fe-FA (PRENATAL VITAMINS PO)    valACYclovir (VALTREX) 500 mg tablet        Allergies   Allergen Reactions    Amoxicillin-Pot Clavulanate Rash       OBJECTIVE:    Vitals: Blood pressure 132/78, pulse 81, temperature 98 7 °F (37 1 °C), temperature source Oral, resp  rate 18, height 5' 10" (1 778 m), weight 86 2 kg (190 lb), last menstrual period 09/03/2020, currently breastfeeding  Body mass index is 27 26 kg/m²  Physical Exam  Constitutional:       Appearance: She is well-developed  HENT:      Head: Normocephalic and atraumatic  Eyes:      Conjunctiva/sclera: Conjunctivae normal       Pupils: Pupils are equal, round, and reactive to light  Cardiovascular:      Rate and Rhythm: Normal rate and regular rhythm  Heart sounds: Normal heart sounds  Pulmonary:      Effort: Pulmonary effort is normal       Breath sounds: Normal breath sounds  Abdominal:      General: Bowel sounds are normal       Palpations: Abdomen is soft  Genitourinary:     Vagina: Normal    Musculoskeletal:         General: Normal range of motion  Cervical back: Normal range of motion and neck supple  Skin:     General: Skin is warm  Neurological:      Mental Status: She is alert and oriented to person, place, and time         Cervix:  Cervical Dilation: 3  Cervical Effacement: 80  Fetal Station: -2  Presentation: Vertex  Method: Manual  OB Examiner: Aguilar         Fetal heart rate: 120       Hutchinson: q 5 minutes       EFW: 7 5    GBS: negative    Prenatal Labs:  GBS negative  Rh A negative  Rubella immune  RPR nonreactive  Hepatitis B negative  HIV negative  Hepatitis-C nonreactive    Invasive Devices     None                   Assessment/Plan     ASSESSMENT:  30yo  at 41w2d weeks gestation who is being admitted for IOL  Pregnancy complicated by: see above  SVE: 3/80/-1  FHT: 1520  Clinical EFW: 7 5 ; Vertex confirmed by U/S  GBS status: negative   Rh: negative    PLAN:    - Admit  - CBC, RPR, Blood Type  - Start with amniotomy   - GBS negative status: no PCN for prophylaxis   - Analgesia and/or epidural at patient request  - Anticipate     Discussed with Dr Thalia Lopez      This patient will be an INPATIENT  and I certify the anticipated length of stay is >2 Midnights      Glendell Soulier MD  2021  6:51 AM

## 2021-06-22 NOTE — OB LABOR/OXYTOCIN SAFETY PROGRESS
Labor Progress Note - Opal Lora 32 y o  female MRN: [id number]    Unit/Bed#: L&D 323-01 Encounter: [id number]       Contraction Frequency (minutes): n/a  Contraction Quality: Not applicable      Cervical Dilation: 4        Cervical Effacement: 80  Fetal Station: -2  Baseline Rate: 130 bpm     FHR Category: Category I               Vital Signs:   Vitals:    06/22/21 0645   BP: 132/78   Pulse: 81   Resp: 18   Temp: 98 7 °F (37 1 °C)           Notes/comments: SVE as above  Patient AROM for clear fluid at 0742  FHT category 1  Fetus engaged against the cervix  Will continue to monitor closely  Dr Iban Rosas aware          Katherin Ragsdale MD 6/22/2021 7:47 AM

## 2021-06-22 NOTE — ANESTHESIA PROCEDURE NOTES
Epidural Block    Patient location during procedure: OB  Start time: 6/22/2021 11:00 AM  Reason for block: primary anesthetic  Staffing  Performed: anesthesiologist   Anesthesiologist: Corry Eastman DO  Preanesthetic Checklist  Completed: patient identified, IV checked, site marked, risks and benefits discussed, surgical consent, monitors and equipment checked, pre-op evaluation and timeout performed  Epidural  Patient position: sitting  Prep: Betadine  Patient monitoring: frequent blood pressure checks  Approach: midline  Injection technique: DENICE saline  Needle  Needle type: Tuohy   Needle gauge: 18 G  Catheter type: side hole  Catheter size: 20 G  Catheter at skin depth: 15 cm  Catheter securement method: surgical tape  Test dose: negativenegative aspiration for CSF, negative aspiration for heme and no paresthesia on injection  patient tolerated the procedure well with no immediate complications

## 2021-06-22 NOTE — OB LABOR/OXYTOCIN SAFETY PROGRESS
Oxytocin Safety Progress Check Note - Namita Lora 32 y o  female MRN: [id number]    Unit/Bed#: L&D 323-01 Encounter: [id number]       Contraction Frequency (minutes): 2-4  Contraction Quality: Moderate      Cervical Dilation: 5        Cervical Effacement: 80  Fetal Station: -1  Baseline Rate: 120 bpm  Fetal Heart Rate: 130 BPM  FHR Category: Category I               Vital Signs:   Vitals:    06/22/21 0645   BP: 132/78   Pulse: 81   Resp: 18   Temp: 98 7 °F (37 1 °C)           Notes/comments: Patient is requesting an epidural  SVE as above  Patient to get an epidural and started on pitocin titration, per Dr Real Oropeza  Will continue to monitor          Demetria Abebe MD 6/22/2021 10:35 AM

## 2021-06-22 NOTE — DISCHARGE SUMMARY
Discharge Summary - OB/GYN   Natasha Lora 32 y o  female MRN: [id number]  Unit/Bed#: L&D 323-01 Encounter: [id number]      Admission Date: 2021     Discharge Date: 2021    Admitting Diagnosis:   1  Pregnancy at 41 weeks   2  History of 2 prior term vaginal deliveries  3  Rh negative   4  H/o fetus with VSD  5  H/o bipolar disorder   6  Post-term pregnancy     Discharge Diagnosis:   Same, delivered  Left labial abraision, repaired    Procedures: spontaneous vaginal delivery    Delivery Attending: Sher Madison MD  Discharge Attending: Dr Galeas Hand Course:     Ms Daina Scott is a 32 y o   at 43wk2d  She presented to labor and delivery for induct  Her pregnancy was complicated by the above  On exam upon admission she was noted to be 3/70/-2  She was admitted for induction with amniotomy  She received an epidural and was found to have made minimal cervical change  An IUPC was placed to better trace her contractions and she was started on pitocin titration  She progressed to complete dilation and started to push  She delivered a viable male  on  at 1321  Weight 7lbs 6 5oz via spontaneous vaginal delivery  Apgars were 9 (1 min) and 9 (5 min)   was transferred to  nursery  Patient tolerated the procedure well and was transferred to recovery in stable condition  Her postpartum course was uncomplicated  Her postpartum pain was well controlled with oral analgesics  Patient is Rh negative, baby is Rh positive  Rhogam was administered  On day of discharge, she was ambulating and able to reasonably perform all ADLs  She was voiding and had appropriate bowel function  Pain was well controlled  She was discharged home on post-partum day #1 without complications   Patient was instructed to follow up with her OB as an outpatient and was given appropriate warnings to call provider if she develops signs of infection or uncontrolled pain     Complications: none apparent    Condition at discharge: good     Discharge instructions/Information to patient and family:   - Do not place anything (no partner, tampons or douche) in your vagina for 6 weeks  -You may walk for exercise for the first 6 weeks then gradually return to your usual activities    -Please do not drive for 2 weeks if you underwent a  delivery     -You may take baths or shower per your preference    -Please look at your bust (breasts) in the mirror daily and call for redness or tenderness or increased warmth    -Please call us for temperature > 100 4*F or 38* C, worsening pain or a foul discharge  Discharge Medications:   Prenatal vitamin daily for 6 months or the duration of nursing whichever is longer    Motrin 600 mg orally every 6 hours as needed for pain  Tylenol (over the counter) per bottle directions as needed for pain: do NOT use with percocet  Hydrocortisone cream 1% (over the counter) applied 1-2x daily to hemorrhoids as needed    Planned Readmission: No

## 2021-06-23 VITALS
DIASTOLIC BLOOD PRESSURE: 70 MMHG | SYSTOLIC BLOOD PRESSURE: 120 MMHG | BODY MASS INDEX: 27.2 KG/M2 | TEMPERATURE: 97.8 F | HEART RATE: 62 BPM | WEIGHT: 190 LBS | OXYGEN SATURATION: 98 % | RESPIRATION RATE: 16 BRPM | HEIGHT: 70 IN

## 2021-06-23 PROBLEM — O09.299 HISTORY OF FETAL ANOMALY IN PRIOR PREGNANCY, CURRENTLY PREGNANT: Status: RESOLVED | Noted: 2018-04-17 | Resolved: 2021-06-23

## 2021-06-23 PROBLEM — O98.513 HSV-2 INFECTION COMPLICATING PREGNANCY, THIRD TRIMESTER: Status: RESOLVED | Noted: 2021-06-22 | Resolved: 2021-06-23

## 2021-06-23 PROBLEM — B00.9 HSV-2 INFECTION COMPLICATING PREGNANCY, THIRD TRIMESTER: Status: RESOLVED | Noted: 2021-06-22 | Resolved: 2021-06-23

## 2021-06-23 PROBLEM — O48.0 POST-TERM PREGNANCY, 40-42 WEEKS OF GESTATION: Status: RESOLVED | Noted: 2021-06-22 | Resolved: 2021-06-23

## 2021-06-23 PROBLEM — Z3A.41 41 WEEKS GESTATION OF PREGNANCY: Status: RESOLVED | Noted: 2021-06-22 | Resolved: 2021-06-23

## 2021-06-23 PROBLEM — O48.0 41 WEEKS GESTATION OF PREGNANCY: Status: RESOLVED | Noted: 2021-06-22 | Resolved: 2021-06-23

## 2021-06-23 LAB
HBV SURFACE AG SER QL: NORMAL
HCV AB SER QL: NORMAL
HIV 1+2 AB+HIV1 P24 AG SERPL QL IA: NORMAL
HIV1 P24 AG SER QL: NORMAL

## 2021-06-23 PROCEDURE — 86803 HEPATITIS C AB TEST: CPT | Performed by: OBSTETRICS & GYNECOLOGY

## 2021-06-23 PROCEDURE — 87806 HIV AG W/HIV1&2 ANTB W/OPTIC: CPT | Performed by: OBSTETRICS & GYNECOLOGY

## 2021-06-23 PROCEDURE — 0HQ9XZZ REPAIR PERINEUM SKIN, EXTERNAL APPROACH: ICD-10-PCS | Performed by: OBSTETRICS & GYNECOLOGY

## 2021-06-23 PROCEDURE — 87340 HEPATITIS B SURFACE AG IA: CPT | Performed by: OBSTETRICS & GYNECOLOGY

## 2021-06-23 RX ORDER — IBUPROFEN 600 MG/1
600 TABLET ORAL EVERY 6 HOURS PRN
Qty: 30 TABLET | Refills: 0 | Status: CANCELLED
Start: 2021-06-23

## 2021-06-23 RX ORDER — ACETAMINOPHEN 325 MG/1
650 TABLET ORAL EVERY 6 HOURS PRN
Refills: 0 | Status: CANCELLED
Start: 2021-06-23

## 2021-06-23 RX ADMIN — DOCUSATE SODIUM 100 MG: 100 CAPSULE ORAL at 08:42

## 2021-06-23 NOTE — PLAN OF CARE
Problem: PAIN - ADULT  Goal: Verbalizes/displays adequate comfort level or baseline comfort level  Description: Interventions:  - Encourage patient to monitor pain and request assistance  - Assess pain using appropriate pain scale  - Administer analgesics based on type and severity of pain and evaluate response  - Implement non-pharmacological measures as appropriate and evaluate response  - Consider cultural and social influences on pain and pain management  - Notify physician/advanced practitioner if interventions unsuccessful or patient reports new pain  Outcome: Progressing     Problem: INFECTION - ADULT  Goal: Absence or prevention of progression during hospitalization  Description: INTERVENTIONS:  - Assess and monitor for signs and symptoms of infection  - Monitor lab/diagnostic results  - Monitor all insertion sites, i e  indwelling lines, tubes, and drains  - Monitor endotracheal if appropriate and nasal secretions for changes in amount and color  - [place] appropriate cooling/warming therapies per order  - Administer medications as ordered  - Instruct and encourage patient and family to use good hand hygiene technique  - Identify and instruct in appropriate isolation precautions for identified infection/condition  Outcome: Progressing  Goal: Absence of fever/infection during neutropenic period  Description: INTERVENTIONS:  - Monitor WBC    Outcome: Progressing     Problem: SAFETY ADULT  Goal: Patient will remain free of falls  Description: INTERVENTIONS:  - Educate patient/family on patient safety including physical limitations  - Instruct patient to call for assistance with activity   - Consult OT/PT to assist with strengthening/mobility   - Keep Call bell within reach  - Keep bed low and locked with side rails adjusted as appropriate  - Keep care items and personal belongings within reach  - Initiate and maintain comfort rounds  - Make Fall Risk Sign visible to staff  - Offer Toileting every  Hours, in advance of need  - Initiate/Maintain alarm  - Obtain necessary fall risk management equipment:   - Apply yellow socks and bracelet for high fall risk patients  - Consider moving patient to room near nurses station  Outcome: Progressing  Goal: Maintain or return to baseline ADL function  Description: INTERVENTIONS:  -  Assess patient's ability to carry out ADLs; assess patient's baseline for ADL function and identify physical deficits which impact ability to perform ADLs (bathing, care of mouth/teeth, toileting, grooming, dressing, etc )  - Assess/evaluate cause of self-care deficits   - Assess range of motion  - Assess patient's mobility; develop plan if impaired  - Assess patient's need for assistive devices and provide as appropriate  - Encourage maximum independence but intervene and supervise when necessary  - Involve family in performance of ADLs  - Assess for home care needs following discharge   - Consider OT consult to assist with ADL evaluation and planning for discharge  - Provide patient education as appropriate  Outcome: Progressing  Goal: Maintains/Returns to pre admission functional level  Description: INTERVENTIONS:  - Perform BMAT or MOVE assessment daily    - Set and communicate daily mobility goal to care team and patient/family/caregiver  - Collaborate with rehabilitation services on mobility goals if consulted  - Perform Range of Motion  times a day  - Reposition patient every hours    - Dangle patient  times a day  - Stand patient  times a day  - Ambulate patient times a day  - Out of bed to chair  times a day   - Out of bed for meals  times a day  - Out of bed for toileting  - Record patient progress and toleration of activity level   Outcome: Progressing     Problem: Knowledge Deficit  Goal: Patient/family/caregiver demonstrates understanding of disease process, treatment plan, medications, and discharge instructions  Description: Complete learning assessment and assess knowledge base   Interventions:  - Provide teaching at level of understanding  - Provide teaching via preferred learning methods  Outcome: Progressing     Problem: DISCHARGE PLANNING  Goal: Discharge to home or other facility with appropriate resources  Description: INTERVENTIONS:  - Identify barriers to discharge w/patient and caregiver  - Arrange for needed discharge resources and transportation as appropriate  - Identify discharge learning needs (meds, wound care, etc )  - Arrange for interpretive services to assist at discharge as needed  - Refer to Case Management Department for coordinating discharge planning if the patient needs post-hospital services based on physician/advanced practitioner order or complex needs related to functional status, cognitive ability, or social support system  Outcome: Progressing

## 2021-06-23 NOTE — PROGRESS NOTES
Progress Note - OB/GYN   jose g Edouardnelida Lora 32 y o  female MRN: [id number]  Unit/Bed#: L&D 305-01 Encounter: [id number]    Candy Corrigan is a patient of Dr Jennifer Peck     Chief Complaint: Postpartum State      Subjective:   Patient is PPD# 1  She is recovering well and is stable  She would like an early discharge pending baby  Pain: no  Tolerating Oral Intake: yes  Voiding: yes  Flatus: yes  Bowel Movement: no  Ambulating: yes  Breastfeeding: Breastfeeding  Chest Pain: no  Shortness of Breath: no  Leg Pain/Discomfort: no  Lochia: moderate    Objective:   Vitals:   /64 (BP Location: Right arm)   Pulse 74   Temp 98 °F (36 7 °C) (Temporal)   Resp 18   Ht 5' 10" (1 778 m)   Wt 86 2 kg (190 lb)   LMP 09/03/2020   Breastfeeding Yes   BMI 27 26 kg/m²   Body mass index is 27 26 kg/m²    I/O       06/21 0701 - 06/22 0700 06/22 0701 - 06/23 0700    Urine (mL/kg/hr)  1900 (0 9)    Total Output  1900    Net  -1900              Lab Results   Component Value Date    WBC 11 45 (H) 06/22/2021    HGB 12 4 06/22/2021    HCT 36 7 06/22/2021    MCV 97 06/22/2021     06/22/2021       Meds/Allergies   Current Facility-Administered Medications   Medication Dose Route Frequency    acetaminophen (TYLENOL) tablet 650 mg  650 mg Oral Q6H PRN    benzocaine-menthol-lanolin-aloe (DERMOPLAST) 20-0 5 % topical spray   Topical 4x Daily PRN    diphenhydrAMINE (BENADRYL) injection 25 mg  25 mg Intravenous Q6H PRN    docusate sodium (COLACE) capsule 100 mg  100 mg Oral BID    hydrocortisone 1 % cream 1 application  1 application Topical 4x Daily PRN    ibuprofen (MOTRIN) tablet 600 mg  600 mg Oral Q6H PRN    witch hazel-glycerin (TUCKS) topical pad 1 pad  1 pad Topical Q2H PRN       Physical Exam:  General: in no apparent distress  Cardiovascular: Cor RRR  Lungs: clear to auscultation bilaterally  Abdomen: abdomen is soft without significant tenderness, masses, organomegaly or guarding  Fundus: Firm, 2 cm below the umbilicus  Lower extremeties: nontender    Assessment:  Post partum day #1 s/p , stable, and doing well    Plan:    PPD# 1  - Continue routine post partum care   - Encourage ambulation   - Encourage breastfeeding  - Continue current meds     Rh Negative   - Mom is Rh negative, Baby is Rh positive    - Rhogam given  @ 6248     Disposition    - Anticipate discharge home PPD1-2    Trinidad Alvarez MD  OB/GYN PGY-1  2021   6:00 AM

## 2021-06-23 NOTE — LACTATION NOTE
This note was copied from a baby's chart  Discharge Consult: Provided education on paced bottle feeding  Reviewed discharge booklet, provided education on 8 or more in 24, monitor output and call baby and me for additional help  Met with mother to go over discharge breastfeeding booklet including the feeding log  Emphasized 8 or more (12) feedings in a 24 hour period, what to expect for the number of diapers per day of life and the progression of properties of the  stooling pattern  Reviewed breastfeeding and your lifestyle, storage and preparation of breast milk, how to keep you breast pump clean, the employed breastfeeding mother and paced bottle feeding handouts  Booklet included Breastfeeding Resources for after discharge including access to the number for the [address]  Information on hand expression given  Discussed benefits of knowing how to manually express breast including stimulating milk supply, softening nipple for latch and evacuating breast in the event of engorgement  Encouraged parents to call for assistance, questions, and concerns about breastfeeding  Extension provided

## 2021-06-23 NOTE — LACTATION NOTE
This note was copied from a baby's chart  CONSULT - LACTATION  Baby Boy Clay Burgess) [place] 1 days male MRN: [id number]    [address] [facility] NURSERY Room / Bed: L&D 305(N)/L&D 305(N) Encounter: [id number]    Maternal Information     MOTHER:  Ezio Dai  Maternal Age: 32 y o    OB History: # 1 - Date: 10/26/16, Sex: Male, Weight: 3289 g (7 lb 4 oz), GA: 41w0d, Delivery: Vaginal, Spontaneous, Apgar1: None, Apgar5: None, Living: Living, Birth Comments: None    # 2 - Date: 18, Sex: Male, Weight: 3374 g (7 lb 7 oz), GA: 41w3d, Delivery: Vaginal, Spontaneous, Apgar1: 8, Apgar5: 9, Living: Living, Birth Comments: None    # 3 - Date: None, Sex: None, Weight: None, GA: None, Delivery: None, Apgar1: None, Apgar5: None, Living: None, Birth Comments: None   Previouse breast reduction surgery? No    Lactation history:   Has patient previously breast fed: Yes   How long had patient previously breast fed: 7 mo & 8 mo   Previous breast feeding complications: None     Past Surgical History:   Procedure Laterality Date    NASAL SEPTUM SURGERY      REFRACTIVE SURGERY          Birth information:  YOB: 2021   Time of birth: 1:21 PM   Sex: male   Delivery type: Vaginal, Spontaneous   Birth Weight: No birth weight on file  Percent of Weight Change: Birth weight not on file     Gestational Age: 38w3d   [unfilled]    Assessment     Breast and nipple assessment: no clinical assessment obtained    [place] Assessment: no clinical assessment    Feeding assessment: as per mom feeding well  LATCH:  Latch: Too sleepy or reluctant, no latch achieved   Audible Swallowing: Spontaneous and intermittent (24 hours old)   Type of Nipple: Everted (After stimulation)   Comfort (Breast/Nipple): Soft/non-tender   Hold (Positioning): No assist from staff, mother able to position/hold infant   LATCH Score: 8          Feeding recommendations:  breast feed on demand   Mom states she breast fed her first child for 7-8 mo  Mom states baby latched well in the delivery room and has been breastfeeding well  No latch during consult as hearing screening was going to begin  Encouraged mom to call lactation to assess a breastfeeding session  Mom wants a Medela max flow  Sent request to case mgt  Information on hand expression given  Discussed benefits of knowing how to manually express breast including stimulating milk supply, softening nipple for latch and evacuating breast in the event of engorgement  Met with mother  Provided mother with Ready, Set, Baby booklet  Discussed Skin to Skin contact an benefits to mom and baby  Talked about the delay of the first bath until baby has adjusted  Spoke about the benefits of rooming in  Feeding on cue and what that means for recognizing infant's hunger  Avoidance of pacifiers for the first month discussed  Talked about exclusive breastfeeding for the first 6 months  Positioning and latch reviewed as well as showing images of other feeding positions  Discussed the properties of a good latch in any position  Reviewed hand/manual expression  Discussed s/s that baby is getting enough milk and some s/s that breastfeeding dyad may need further help  Gave information on common concerns, what to expect the first few weeks after delivery, preparing for other caregivers, and how partners can help  Resources for support also provided  Encouraged parents to call for assistance, questions, and concerns about breastfeeding  Extension provided      Cortez Kingston 6/23/2021 7:42 AM

## 2021-06-30 LAB — PLACENTA IN STORAGE: NORMAL

## 2023-08-29 ENCOUNTER — OFFICE VISIT (OUTPATIENT)
Age: 33
End: 2023-08-29
Payer: COMMERCIAL

## 2023-08-29 VITALS
HEIGHT: 70 IN | BODY MASS INDEX: 27.2 KG/M2 | DIASTOLIC BLOOD PRESSURE: 73 MMHG | HEART RATE: 63 BPM | WEIGHT: 190 LBS | SYSTOLIC BLOOD PRESSURE: 117 MMHG

## 2023-08-29 DIAGNOSIS — M54.59 MECHANICAL LOW BACK PAIN: ICD-10-CM

## 2023-08-29 DIAGNOSIS — M99.03 SEGMENTAL DYSFUNCTION OF LUMBAR REGION: Primary | ICD-10-CM

## 2023-08-29 DIAGNOSIS — M99.02 SEGMENTAL DYSFUNCTION OF THORACIC REGION: ICD-10-CM

## 2023-08-29 DIAGNOSIS — M99.04 SEGMENTAL DYSFUNCTION OF SACRAL REGION: ICD-10-CM

## 2023-08-29 PROCEDURE — 98941 CHIROPRACT MANJ 3-4 REGIONS: CPT | Performed by: CHIROPRACTOR

## 2023-08-29 PROCEDURE — 99203 OFFICE O/P NEW LOW 30 MIN: CPT | Performed by: CHIROPRACTOR

## 2023-08-29 PROCEDURE — 97110 THERAPEUTIC EXERCISES: CPT | Performed by: CHIROPRACTOR

## 2023-08-29 NOTE — PROGRESS NOTES
Diagnoses and all orders for this visit:    Segmental dysfunction of lumbar region    Mechanical low back pain    Segmental dysfunction of sacral region    Segmental dysfunction of thoracic region       ASSESSMENT:  No red flags, radiculopathy or neurologic deficit appreciated clinically. Pt's symptoms and exam findings consistent with mechanical lbp secondary to repetitive st/sp injury, exacerbated by postural/ergonomic stressors. Pt responded well to flexion biased stretches and manual mobilization of the affected spinal and myofascial tissues with increased ROM; trial of conservative tx recommended consisting of stretching, graded mobilization/manipulation of the affected spinal and myofascial jt dysfunction, postural/ergonomic education and take home stretches/exercises. If symptoms fail to improve with short trial of conservative care, appropriate imaging and referral will be coordinated. Spent greater than 30 min c pt discussing hx, pe, ddx, tx options and reviewing notes/imaging    PROCEDURE CODES: 70203 and 77942, [id number]    TREATMENT:  Fear avoidance behavior discussion; encouraged and reassured pt that natural course of condition is to improve over time with adherence to tx plan and home care strategies. Home care recommendations: avoid bed rest, walk (but avoid trails and uneven surfaces), gradual return to activity to tolerance (avoid anything that peripheralizes symptoms), call if symptoms peripheralize, worsen, or neurologic deficit progresses. Ther-ex: IASTM; discussed post procedure soreness and/or ecchymosis for up to 36 hrs, applied to affected mm hypertonicities; supine hamstring stretch, supine gluteal stretch, side laying QL stretch, single knee to chest stretch, hip flexor pin-and-stretch, alternating prone hip extension, glute bridge, transitional mvmt education, abdominal bracing; greater than 15 min spent performing above mentioned ther-ex to improve ROM/flexibility.  Thoracic mobilization/manipulation: prone P-A mob; Lumbar mobilization/manipulation: diversified side laying graded HVLA, flexion-traction; SIJ Manipulation/Mobilization: R/L SIJ HVLA - long axis distraction, schroeder drop table maneuver to affected SIJ    HPI:  Sukumar Harding is a 35 y.o. female  Chief Complaint   Patient presents with   • Back Pain     Lower lumbar pain that does not radiate. Patient states tightness. Pain has improved since last week. Pain score 3     The patient reports pain in the lower back that started after lifting son from high playground about one week ago. The patient reports no pain in the leg. The patient reports to [facility] [place] 4 times a week. Past Medical History:   Diagnosis Date   • Anemia 2010    1 episode   • Back problem 2018    did physical therapy earlier this year   • Cystic fibrosis carrier    • Herpes    • Seasonal allergies    • Varicella       Past Surgical History:   Procedure Laterality Date   • NASAL SEPTUM SURGERY  2007   • REFRACTIVE SURGERY  2012     The following portions of the patient's history were reviewed and updated as appropriate: allergies, past family history, past medical history, past social history, past surgical history, and problem list.  Review of Systems   Constitutional: Negative for activity change, fatigue, fever and unexpected weight change. HENT: Negative for ear pain, hearing loss, sinus pressure, sinus pain, sore throat and tinnitus. Respiratory: Negative for chest tightness, shortness of breath, wheezing and stridor. Cardiovascular: Negative for chest pain. Genitourinary: Negative for flank pain and frequency. Musculoskeletal: Negative for back pain, joint swelling, neck pain and neck stiffness. Skin: Negative for color change and pallor. Neurological: Negative for dizziness, speech difficulty, weakness, numbness and headaches. Psychiatric/Behavioral: Negative for agitation and sleep disturbance.  The patient is not nervous/anxious. Physical Exam  Constitutional:       General: She is not in acute distress. Appearance: Normal appearance. HENT:      Head: Normocephalic. Mouth/Throat:      Mouth: Mucous membranes are moist.   Eyes:      Extraocular Movements: Extraocular movements intact. Conjunctiva/sclera: Conjunctivae normal.      Pupils: Pupils are equal, round, and reactive to light. Neck:      Vascular: No carotid bruit. Pulmonary:      Effort: Pulmonary effort is normal.   Chest:      Chest wall: No tenderness. Abdominal:      General: Abdomen is flat. Palpations: Abdomen is soft. Musculoskeletal:         General: No swelling, deformity or signs of injury. Cervical back: Normal range of motion. No rigidity or tenderness. Thoracic back: Spasms present. No tenderness or bony tenderness. Decreased range of motion. Lumbar back: Spasms and tenderness present. No swelling, edema, deformity, signs of trauma, lacerations or bony tenderness. Decreased range of motion. Negative right straight leg raise test and negative left straight leg raise test.        Back:       Right lower leg: No edema. Left lower leg: No edema. Lymphadenopathy:      Cervical: No cervical adenopathy. Skin:     General: Skin is warm. Coloration: Skin is not jaundiced or pale. Findings: No bruising or erythema. Neurological:      Mental Status: She is alert and oriented to person, place, and time. Cranial Nerves: No cranial nerve deficit. Sensory: No sensory deficit. Motor: No weakness. Gait: Gait is intact. Deep Tendon Reflexes: Reflexes are normal and symmetric. Psychiatric:         Attention and Perception: Attention normal.         Mood and Affect: Mood and affect normal.         Speech: Speech normal.         Behavior: Behavior normal. Behavior is cooperative. Thought Content:  Thought content normal.         Cognition and Memory: Cognition normal. Judgment: Judgment normal.       SOFT TISSUE ASSESSMENT Hypertonicity and tenderness palpated B T10-S1 erector spinae, hip flexor, glute med/min, QL, hamstring JOINT RESTRICTIONS: T10-S1 and R/L SIJ ORTHO: SI jt point tenderness: +; Harleen unremarkable for centralization/peripheralization; charla's, iliac compression, thigh thrust elicit lbp in R/L SIJ; prone femoral nerve stretch neg for upper lumbar neural tension, elicits R/L SIJ stiffness; sitting root elicits no lbp on R/L; slump test elicits no neural tension R/L    Return in about 1 week (around 9/5/2023) for Recheck.

## 2023-10-25 ENCOUNTER — APPOINTMENT (OUTPATIENT)
Dept: LAB | Facility: HOSPITAL | Age: 33
End: 2023-10-25
Payer: COMMERCIAL

## 2023-10-25 DIAGNOSIS — N92.5 RETROGRADE MENSTRUATION: ICD-10-CM

## 2023-10-25 LAB
B-HCG SERPL-ACNC: ABNORMAL MIU/ML (ref 0–5)
TSH SERPL DL<=0.05 MIU/L-ACNC: 1.12 UIU/ML (ref 0.45–4.5)

## 2023-10-25 PROCEDURE — 84443 ASSAY THYROID STIM HORMONE: CPT

## 2023-10-25 PROCEDURE — 36415 COLL VENOUS BLD VENIPUNCTURE: CPT

## 2023-10-25 PROCEDURE — 84702 CHORIONIC GONADOTROPIN TEST: CPT

## 2023-10-28 ENCOUNTER — APPOINTMENT (OUTPATIENT)
Dept: LAB | Facility: HOSPITAL | Age: 33
End: 2023-10-28
Payer: COMMERCIAL

## 2023-10-28 DIAGNOSIS — O36.80X0 ULTRASOUND SCAN TO CONFIRM FETAL VIABILITY WITH HISTORY OF MISCARRIAGE: ICD-10-CM

## 2023-10-28 DIAGNOSIS — Z87.59 ULTRASOUND SCAN TO CONFIRM FETAL VIABILITY WITH HISTORY OF MISCARRIAGE: ICD-10-CM

## 2023-10-28 DIAGNOSIS — N92.5 RETROGRADE MENSTRUATION: ICD-10-CM

## 2023-10-28 LAB — B-HCG SERPL-ACNC: ABNORMAL MIU/ML (ref 0–5)

## 2023-10-28 PROCEDURE — 36415 COLL VENOUS BLD VENIPUNCTURE: CPT

## 2023-10-28 PROCEDURE — 84702 CHORIONIC GONADOTROPIN TEST: CPT

## 2023-11-27 ENCOUNTER — ROUTINE PRENATAL (OUTPATIENT)
Dept: PERINATAL CARE | Facility: OTHER | Age: 33
End: 2023-11-27
Payer: COMMERCIAL

## 2023-11-27 VITALS
HEIGHT: 70 IN | SYSTOLIC BLOOD PRESSURE: 110 MMHG | DIASTOLIC BLOOD PRESSURE: 62 MMHG | BODY MASS INDEX: 23.77 KG/M2 | HEART RATE: 77 BPM | WEIGHT: 166 LBS

## 2023-11-27 DIAGNOSIS — Z3A.12 12 WEEKS GESTATION OF PREGNANCY: ICD-10-CM

## 2023-11-27 DIAGNOSIS — Z36.82 ENCOUNTER FOR (NT) NUCHAL TRANSLUCENCY SCAN: ICD-10-CM

## 2023-11-27 DIAGNOSIS — Z14.1 CYSTIC FIBROSIS CARRIER IN FIRST TRIMESTER, ANTEPARTUM: ICD-10-CM

## 2023-11-27 DIAGNOSIS — Z36.0 ENCOUNTER FOR ANTENATAL SCREENING FOR CHROMOSOMAL ANOMALIES: ICD-10-CM

## 2023-11-27 DIAGNOSIS — O09.891 CYSTIC FIBROSIS CARRIER IN FIRST TRIMESTER, ANTEPARTUM: ICD-10-CM

## 2023-11-27 DIAGNOSIS — O09.899 SUPERVISION OF OTHER HIGH RISK PREGNANCY, ANTEPARTUM: ICD-10-CM

## 2023-11-27 DIAGNOSIS — O35.2XX0 PREVIOUS CHILD BORN WITH VENTRICULAR SEPTAL DEFECT, CURRENTLY PREGNANT, SINGLE OR UNSPECIFIED FETUS: Primary | ICD-10-CM

## 2023-11-27 PROBLEM — Z82.49 FAMILY HISTORY OF CARDIAC DISORDER: Status: ACTIVE | Noted: 2023-11-27

## 2023-11-27 PROBLEM — Z82.79 FAMILY HISTORY OF CONGENITAL CARDIAC SEPTAL DEFECT: Status: ACTIVE | Noted: 2023-11-27

## 2023-11-27 PROCEDURE — 36415 COLL VENOUS BLD VENIPUNCTURE: CPT | Performed by: OBSTETRICS & GYNECOLOGY

## 2023-11-27 PROCEDURE — 99243 OFF/OP CNSLTJ NEW/EST LOW 30: CPT | Performed by: NURSE PRACTITIONER

## 2023-11-27 PROCEDURE — 76801 OB US < 14 WKS SINGLE FETUS: CPT | Performed by: OBSTETRICS & GYNECOLOGY

## 2023-11-27 PROCEDURE — 76813 OB US NUCHAL MEAS 1 GEST: CPT | Performed by: OBSTETRICS & GYNECOLOGY

## 2023-11-27 NOTE — PROGRESS NOTES
Anika Torres      Dear Dr. Fernando Vazquez,      Thank you for requesting a  consultation on your patient [facility] for the following indications:  Genetic screening    History  Medications: Prenatal vitamins  Allergies to medications: Amoxicillin  Past medical history: Cystic fibrosis carrier and seasonal allergies  Past surgical history: Nasal septum surgery and refractive surgery  Past obstetrical history:  4 para 3-0-0-3. Between 2016 and 2021 she had 3 term vaginal deliveries of male neonates weighing between 7 pounds 4 ounces and 7 pounds 7 ounces. She denies pregnancy complications. Her first son had a VSD that resolved spontaneously. Social history: Denies current use of alcohol, tobacco or drugs of abuse. First generation family history: Hypertension in her father and cystic fibrosis in her sister. Ultrasound findings: The ultrasound shows a fetus concordant with dates. The nasal bone and nuchal translucency appear normal. No malformations are seen on today's early ultrasound. The patient was informed of the findings and counseled about the limitations of the exam in detecting all forms of fetal congenital abnormalities. She does not report any vaginal bleeding or uterine cramping or contractions. Specific counseling was provided on the following problems:  1. We discussed the options for genetic screening which include invasive testing on the fetal placenta or on fetal skin cells within the amniotic fluid and compared this to noninvasive testing which includes cell free DNA screening and the sequential screen. We reviewed the risks, the benefits and the limitations of each. In the end patient chose to complete the cell free DNA screen. 2.  She is a known carrier of cystic fibrosis. Her partner has been tested and is not a carrier of cystic fibrosis. 3. She has a child with a history of VSD. A fetal echocardiogram is recommended.      4. She is up-to-date with her flu and COVID vaccines. Future tests recommended: The results of her NIPT will return in 7-10 days and her OB office will order an MSAFP screen at 16-18 weeks to screen for spina bifida. Future ultrasounds ordered today:   1. Fetal Level II ultrasound imaging is recommended at 19-20 weeks' gestation. 2. Fetal echocardiogram between 22-24 weeks gestation. Split-shared decision-making between [employer] and myself was utilized, with the majority of the time spent by Marissa Nelson. Medical decision-making for this encounter was moderate (diagnosis moderate, data moderate and risk moderate). I reviewed the ultrasound pictures and recommended the medical decision making transcribed in the care of this patient.       Hudson Brown MD

## 2023-11-27 NOTE — LETTER
2023     Eran Vasquez, [address] [address] [place]    Patient: Janiya Armando   YOB: 1990   Date of Visit: 2023       Dear Dr. Lona Flores:    Thank you for referring Janay Leiva to me for evaluation. Below are my notes for this consultation. If you have questions, please do not hesitate to call me. I look forward to following your patient along with you. Sincerely,        Butch Mittal MD        CC: No Recipients    Butch Mittal MD  2023  5:39 PM  Sign when Signing Ramos Quispe      Dear Dr. Lona Flores,      Thank you for requesting a  consultation on your patient Janay Leiva for the following indications:  Genetic screening    History  Medications: Prenatal vitamins  Allergies to medications: Amoxicillin  Past medical history: Cystic fibrosis carrier and seasonal allergies  Past surgical history: Nasal septum surgery and refractive surgery  Past obstetrical history:  4 para 3-0-0-3. Between 2016 and 2021 she had 3 term vaginal deliveries of male neonates weighing between 7 pounds 4 ounces and 7 pounds 7 ounces. She denies pregnancy complications. Her first son had a VSD that resolved spontaneously. Social history: Denies current use of alcohol, tobacco or drugs of abuse. First generation family history: Hypertension in her father and cystic fibrosis in her sister. Ultrasound findings: The ultrasound shows a fetus concordant with dates. The nasal bone and nuchal translucency appear normal. No malformations are seen on today's early ultrasound. The patient was informed of the findings and counseled about the limitations of the exam in detecting all forms of fetal congenital abnormalities. She does not report any vaginal bleeding or uterine cramping or contractions. Specific counseling was provided on the following problems:  1.  We discussed the options for genetic screening which include invasive testing on the fetal placenta or on fetal skin cells within the amniotic fluid and compared this to noninvasive testing which includes cell free DNA screening and the sequential screen. We reviewed the risks, the benefits and the limitations of each. In the end patient chose to complete the cell free DNA screen. 2.  She is a known carrier of cystic fibrosis. Her partner has been tested and is not a carrier of cystic fibrosis. 3. She has a child with a history of VSD. A fetal echocardiogram is recommended. 4. She is up-to-date with her flu and COVID vaccines. Future tests recommended: The results of her NIPT will return in 7-10 days and her OB office will order an MSAFP screen at 16-18 weeks to screen for spina bifida. Future ultrasounds ordered today:   1. Fetal Level II ultrasound imaging is recommended at 19-20 weeks' gestation. 2. Fetal echocardiogram between 22-24 weeks gestation. Split-shared decision-making between [employer] and myself was utilized, with the majority of the time spent by Irena Guzman. Medical decision-making for this encounter was moderate (diagnosis moderate, data moderate and risk moderate). I reviewed the ultrasound pictures and recommended the medical decision making transcribed in the care of this patient.       Miriam Mcrae MD Destruction After The Procedure: No

## 2023-11-27 NOTE — PATIENT INSTRUCTIONS
Thank you for choosing us for your  care today. If you have any questions about your ultrasound or care, please do not hesitate to contact us or your primary obstetrician. Some general instructions for your pregnancy are:    Exercise: Aim for 22 minutes per day (150 minutes per week) of regular exercise. Walking is great! Nutrition: Choose healthy sources of calcium, iron, and protein. Learn about Preeclampsia: preeclampsia is a common, serious high blood pressure complication in pregnancy. A blood pressure of 762NTVV (systolic or top number) or 00GNOX (diastolic or bottom number) is not normal and needs evaluation by your doctor. Aspirin is sometimes prescribed in early pregnancy to prevent preeclampsia in women with risk factors - ask your obstetrician if you should be on this medication. For more resources, visit:  [facility].  Consider the RSV vaccine (Abrysvo) between 32 weeks 0 days and 36 weeks 6 days to protect your baby. If you smoke, try to reduce how many cigarettes you smoke or try to quit completely. Do not vape. Other warning signs to watch out for in pregnancy or postpartum: chest pain, obstructed breathing or shortness of breath, seizures, thoughts of hurting yourself or your baby, bleeding, a painful or swollen leg, fever, or headache (see [employer] POST-BIRTH Warning Signs campaign). If these happen call 911. Itching is also not normal in pregnancy and if you experience this, especially over your hands and feet, potentially worse at night, notify your doctors.

## 2023-11-27 NOTE — PROGRESS NOTES
Patient chose to have [facility] Non-invasive Prenatal Screen with fetal sex. Patient choose billed through insurance. Patient assistance program (PAP) application provided to patient no. PAP sent with specimen No.     Patient given brochure and is aware [facility] will contact their insurance and coordinate coverage. Patient made aware she will receive a text message and e-mail from [employer]. Patient informed text/phone call message will come from area code  "415". Provided [employer] # [phone] and web site at [place].   "[place] your test online" card with barcode and test tube ID provided to patient. Reviewed [employer]'s web site states 5-7 business days for results via their portal.   [employer] message will be sent to patient when M receives results /provider reviews. 2 vials of blood drawn from  right arm by Arely Butler RN. Patient tolerated blood draw without difficulty. Specimens labeled with patient identifiers (name, date of birth, specimen collection date), order and specimen were verified with patient, packed and sent via [address]. [employer]  tracking #  [id number]  Copy of lab order scanned to Epic media. Maternal Fetal Medicine will have results in approximately 7-10 business days and will call patient or notify via [address] [place] [address]. Patient aware viewing lab result online will reveal fetal sex if ordered. Patient verbalized understanding of all instructions and no questions at this time.

## 2023-12-04 ENCOUNTER — TELEPHONE (OUTPATIENT)
Dept: PERINATAL CARE | Facility: OTHER | Age: 33
End: 2023-12-04

## 2023-12-04 NOTE — TELEPHONE ENCOUNTER
Left voicemail for Tish Mesa reviewing her non invasive prenatal screening (NIPS) results screened low risk, fetal sex not revealed. Explained if Tish Mesa would like to know the fetal sex of her baby, encouraged to review test results in mychart. If patient wishes not want to know fetal sex, advised to not open her test result in mychart. Advised there is routine blood screening for spina bifida that is recommended to complete in the second trimester (16-18 weeks) that will be completed through your obstetrician's office. Contact the  office at [phone] with any questions. I also faxed the result to Dr. Jazz Rodriguez office.

## 2023-12-04 NOTE — TELEPHONE ENCOUNTER
----- Message from Marleen Dyer sent at 12/4/2023  7:44 AM EST -----  [facility] RN staff, I've reviewed this NIPT result which is low-risk. I sent her a [employer] results message. MSAFP should be ordered by primary OB office to be completed between 15-20 weeks gestation. She is Dr. Luana River patient so please fax  results to her office. Thank you.     Jyoti Bowling

## 2024-01-24 PROCEDURE — 87591 N.GONORRHOEAE DNA AMP PROB: CPT | Performed by: OBSTETRICS & GYNECOLOGY

## 2024-01-24 PROCEDURE — 87491 CHLMYD TRACH DNA AMP PROBE: CPT | Performed by: OBSTETRICS & GYNECOLOGY

## 2024-01-25 ENCOUNTER — ROUTINE PRENATAL (OUTPATIENT)
Dept: PERINATAL CARE | Facility: OTHER | Age: 34
End: 2024-01-25
Payer: COMMERCIAL

## 2024-01-25 VITALS
HEART RATE: 88 BPM | DIASTOLIC BLOOD PRESSURE: 62 MMHG | SYSTOLIC BLOOD PRESSURE: 124 MMHG | HEIGHT: 70 IN | BODY MASS INDEX: 23.82 KG/M2

## 2024-01-25 DIAGNOSIS — Z3A.21 21 WEEKS GESTATION OF PREGNANCY: ICD-10-CM

## 2024-01-25 DIAGNOSIS — Z36.86 ENCOUNTER FOR ANTENATAL SCREENING FOR CERVICAL LENGTH: ICD-10-CM

## 2024-01-25 DIAGNOSIS — O35.2XX0 HEREDITARY DISEASE IN FAMILY POSSIBLY AFFECTING FETUS, AFFECTING MANAGEMENT OF MOTHER IN PREGNANCY, SINGLE OR UNSPECIFIED FETUS: Primary | ICD-10-CM

## 2024-01-25 PROCEDURE — 99213 OFFICE O/P EST LOW 20 MIN: CPT | Performed by: OBSTETRICS & GYNECOLOGY

## 2024-01-25 PROCEDURE — 76817 TRANSVAGINAL US OBSTETRIC: CPT | Performed by: OBSTETRICS & GYNECOLOGY

## 2024-01-25 PROCEDURE — 76811 OB US DETAILED SNGL FETUS: CPT | Performed by: OBSTETRICS & GYNECOLOGY

## 2024-01-25 NOTE — PROGRESS NOTES
The patient was seen today for an ultrasound.  Please see ultrasound report (located under Ob Procedures) for additional details.   Thank you very much for allowing us to participate in the care of this very nice patient.  Should you have any questions, please do not hesitate to contact me.     Sven Wyatt MD FACOG  Attending Physician, Maternal-Fetal Medicine  [facility] [address] [facility]

## 2024-01-25 NOTE — PROGRESS NOTES
Ultrasound Probe Disinfection    A transvaginal ultrasound was performed.   Prior to use, disinfection was performed with High Level Disinfection Process [employer]).  Probe serial number [id number]: [id number] was used.    Chaperone: Aide Sherman, Medical Assistant  Kevin Turcios RDMS

## 2024-01-26 ENCOUNTER — LAB REQUISITION (OUTPATIENT)
Dept: LAB | Facility: HOSPITAL | Age: 34
End: 2024-01-26
Payer: COMMERCIAL

## 2024-01-26 DIAGNOSIS — Z34.82 ENCOUNTER FOR SUPERVISION OF OTHER NORMAL PREGNANCY, SECOND TRIMESTER: ICD-10-CM

## 2024-01-26 LAB
C TRACH DNA SPEC QL NAA+PROBE: NEGATIVE
N GONORRHOEA DNA SPEC QL NAA+PROBE: NEGATIVE

## 2024-02-22 ENCOUNTER — TELEPHONE (OUTPATIENT)
Age: 34
End: 2024-02-22

## 2024-02-22 NOTE — TELEPHONE ENCOUNTER
This is a mutual patient. Dr. Crain's (her OB) office is calling regarding this patient's 20 week anatomy scan that was done on 1/25. They need the results faxed over. They also have been trying to get the results of her Gc/Chlam (I am unsure if you can send them as well)    Dr. Crain is an independent provider. The fax number for the office is [phone]

## 2024-03-28 DIAGNOSIS — O26.899 RH NEGATIVE STATUS DURING PREGNANCY: Primary | ICD-10-CM

## 2024-03-28 DIAGNOSIS — Z67.91 RH NEGATIVE STATUS DURING PREGNANCY: Primary | ICD-10-CM

## 2024-05-01 PROCEDURE — 87150 DNA/RNA AMPLIFIED PROBE: CPT | Performed by: OBSTETRICS & GYNECOLOGY

## 2024-05-10 ENCOUNTER — LAB REQUISITION (OUTPATIENT)
Dept: LAB | Facility: HOSPITAL | Age: 34
End: 2024-05-10
Payer: COMMERCIAL

## 2024-05-10 DIAGNOSIS — Z34.83 ENCOUNTER FOR SUPERVISION OF OTHER NORMAL PREGNANCY, THIRD TRIMESTER: ICD-10-CM

## 2024-05-13 LAB — GP B STREP DNA SPEC QL NAA+PROBE: NEGATIVE

## 2024-06-12 ENCOUNTER — ULTRASOUND (OUTPATIENT)
Dept: PERINATAL CARE | Facility: OTHER | Age: 34
End: 2024-06-12
Payer: COMMERCIAL

## 2024-06-12 ENCOUNTER — APPOINTMENT (OUTPATIENT)
Dept: PERINATAL CARE | Facility: OTHER | Age: 34
End: 2024-06-12
Payer: COMMERCIAL

## 2024-06-12 VITALS
WEIGHT: 194.2 LBS | HEIGHT: 70 IN | BODY MASS INDEX: 27.8 KG/M2 | DIASTOLIC BLOOD PRESSURE: 72 MMHG | HEART RATE: 62 BPM | SYSTOLIC BLOOD PRESSURE: 116 MMHG

## 2024-06-12 DIAGNOSIS — O48.0 POST-TERM PREGNANCY, 40-42 WEEKS OF GESTATION: Primary | ICD-10-CM

## 2024-06-12 DIAGNOSIS — O48.0 41 WEEKS GESTATION OF PREGNANCY: ICD-10-CM

## 2024-06-12 DIAGNOSIS — Z3A.41 41 WEEKS GESTATION OF PREGNANCY: ICD-10-CM

## 2024-06-12 PROCEDURE — 76815 OB US LIMITED FETUS(S): CPT | Performed by: OBSTETRICS & GYNECOLOGY

## 2024-06-12 PROCEDURE — 59025 FETAL NON-STRESS TEST: CPT | Performed by: OBSTETRICS & GYNECOLOGY

## 2024-06-12 NOTE — PROGRESS NOTES
Non-Stress Testing:    Non-Stress test, equipment, procedure, and expected outcomes explained. Reviewed fetal kick counts and when to call OB.Verified patient understanding of fetal kick counts with teach back method. Patient reports feeling daily fetal movements. Patient has no questions or concerns.        Dr. Wyatt viewed NST strip prior to completion of visit.

## 2024-06-13 ENCOUNTER — HOSPITAL ENCOUNTER (INPATIENT)
Facility: HOSPITAL | Age: 34
LOS: 1 days | Discharge: HOME | End: 2024-06-14
Attending: OBSTETRICS & GYNECOLOGY | Admitting: OBSTETRICS & GYNECOLOGY
Payer: COMMERCIAL

## 2024-06-13 ENCOUNTER — HOSPITAL ENCOUNTER (OUTPATIENT)
Dept: LABOR AND DELIVERY | Facility: HOSPITAL | Age: 34
End: 2024-06-13
Payer: COMMERCIAL

## 2024-06-13 DIAGNOSIS — O48.0 41 WEEKS GESTATION OF PREGNANCY: Primary | ICD-10-CM

## 2024-06-13 DIAGNOSIS — Z3A.41 41 WEEKS GESTATION OF PREGNANCY: Primary | ICD-10-CM

## 2024-06-13 PROBLEM — B00.9 HSV (HERPES SIMPLEX VIRUS) INFECTION: Status: ACTIVE | Noted: 2024-06-13

## 2024-06-13 LAB
ABO GROUP BLD: NORMAL
ABO GROUP BLD: NORMAL
BASE EXCESS BLDCOA CALC-SCNC: -2.9 MMOL/L (ref 3–11)
BASE EXCESS BLDCOV CALC-SCNC: -3.4 MMOL/L (ref 1–9)
BLD GP AB SCN SERPL QL: POSITIVE
BLD GP AB SCN SERPL QL: POSITIVE
BLOOD GROUP ANTIBODIES SERPL: NORMAL
ERYTHROCYTE [DISTWIDTH] IN BLOOD BY AUTOMATED COUNT: 13.1 % (ref 11.6–15.1)
FETAL CELL SCN BLD QL ROSETTE: NEGATIVE
HCO3 BLDCOA-SCNC: 24.6 MMOL/L (ref 17.3–27.3)
HCO3 BLDCOV-SCNC: 21.5 MMOL/L (ref 12.2–28.6)
HCT VFR BLD AUTO: 38 % (ref 34.8–46.1)
HGB BLD-MCNC: 13.3 G/DL (ref 11.5–15.4)
HOLD SPECIMEN: YES
MCH RBC QN AUTO: 34 PG (ref 26.8–34.3)
MCHC RBC AUTO-ENTMCNC: 35 G/DL (ref 31.4–37.4)
MCV RBC AUTO: 97 FL (ref 82–98)
O2 CT VFR BLDCOA CALC: 7.9 ML/DL
OXYHGB MFR BLDCOA: 30.5 %
OXYHGB MFR BLDCOV: 74.4 %
PCO2 BLDCOA: 52.3 MM[HG] (ref 30–60)
PCO2 BLDCOV: 38.9 MM HG (ref 27–43)
PH BLDCOA: 7.29 [PH] (ref 7.23–7.43)
PH BLDCOV: 7.36 [PH] (ref 7.19–7.49)
PLATELET # BLD AUTO: 212 THOUSANDS/UL (ref 149–390)
PMV BLD AUTO: 9.4 FL (ref 8.9–12.7)
PO2 BLDCOA: 17 MM HG (ref 5–25)
PO2 BLDCOV: 34.4 MM HG (ref 15–45)
RBC # BLD AUTO: 3.91 MILLION/UL (ref 3.81–5.12)
RH BLD: NEGATIVE
RH BLD: NEGATIVE
SAO2 % BLDCOV: 19 ML/DL
SPECIMEN EXPIRATION DATE: NORMAL
TREPONEMA PALLIDUM IGG+IGM AB [PRESENCE] IN SERUM OR PLASMA BY IMMUNOASSAY: NORMAL
WBC # BLD AUTO: 13.67 THOUSAND/UL (ref 4.31–10.16)

## 2024-06-13 PROCEDURE — 99212 OFFICE O/P EST SF 10 MIN: CPT

## 2024-06-13 PROCEDURE — 86850 RBC ANTIBODY SCREEN: CPT

## 2024-06-13 PROCEDURE — 86900 BLOOD TYPING SEROLOGIC ABO: CPT

## 2024-06-13 PROCEDURE — 85027 COMPLETE CBC AUTOMATED: CPT

## 2024-06-13 PROCEDURE — 86901 BLOOD TYPING SEROLOGIC RH(D): CPT

## 2024-06-13 PROCEDURE — 86780 TREPONEMA PALLIDUM: CPT

## 2024-06-13 PROCEDURE — 85461 HEMOGLOBIN FETAL: CPT

## 2024-06-13 PROCEDURE — 86870 RBC ANTIBODY IDENTIFICATION: CPT | Performed by: OBSTETRICS & GYNECOLOGY

## 2024-06-13 PROCEDURE — 82805 BLOOD GASES W/O2 SATURATION: CPT | Performed by: OBSTETRICS & GYNECOLOGY

## 2024-06-13 RX ORDER — ACETAMINOPHEN 325 MG/1
650 TABLET ORAL EVERY 4 HOURS PRN
Status: DISCONTINUED | OUTPATIENT
Start: 2024-06-13 | End: 2024-06-14 | Stop reason: HOSPADM

## 2024-06-13 RX ORDER — BENZOCAINE/MENTHOL 6 MG-10 MG
1 LOZENGE MUCOUS MEMBRANE DAILY PRN
Status: DISCONTINUED | OUTPATIENT
Start: 2024-06-13 | End: 2024-06-14 | Stop reason: HOSPADM

## 2024-06-13 RX ORDER — OXYTOCIN/RINGER'S LACTATE 30/500 ML
250 PLASTIC BAG, INJECTION (ML) INTRAVENOUS ONCE
Status: DISCONTINUED | OUTPATIENT
Start: 2024-06-13 | End: 2024-06-14 | Stop reason: HOSPADM

## 2024-06-13 RX ORDER — OXYTOCIN/RINGER'S LACTATE 30/500 ML
PLASTIC BAG, INJECTION (ML) INTRAVENOUS
Status: COMPLETED
Start: 2024-06-13 | End: 2024-06-13

## 2024-06-13 RX ORDER — DIPHENHYDRAMINE HCL 25 MG
25 TABLET ORAL EVERY 6 HOURS PRN
Status: DISCONTINUED | OUTPATIENT
Start: 2024-06-13 | End: 2024-06-14 | Stop reason: HOSPADM

## 2024-06-13 RX ORDER — ONDANSETRON 2 MG/ML
4 INJECTION INTRAMUSCULAR; INTRAVENOUS EVERY 6 HOURS PRN
Status: DISCONTINUED | OUTPATIENT
Start: 2024-06-13 | End: 2024-06-13

## 2024-06-13 RX ORDER — IBUPROFEN 600 MG/1
600 TABLET ORAL EVERY 6 HOURS
Status: DISCONTINUED | OUTPATIENT
Start: 2024-06-13 | End: 2024-06-14 | Stop reason: HOSPADM

## 2024-06-13 RX ORDER — CALCIUM CARBONATE 500 MG/1
1000 TABLET, CHEWABLE ORAL DAILY PRN
Status: DISCONTINUED | OUTPATIENT
Start: 2024-06-13 | End: 2024-06-14 | Stop reason: HOSPADM

## 2024-06-13 RX ORDER — SODIUM CHLORIDE, SODIUM LACTATE, POTASSIUM CHLORIDE, CALCIUM CHLORIDE 600; 310; 30; 20 MG/100ML; MG/100ML; MG/100ML; MG/100ML
125 INJECTION, SOLUTION INTRAVENOUS CONTINUOUS
Status: DISCONTINUED | OUTPATIENT
Start: 2024-06-13 | End: 2024-06-13

## 2024-06-13 RX ORDER — BUPIVACAINE HYDROCHLORIDE 2.5 MG/ML
30 INJECTION, SOLUTION EPIDURAL; INFILTRATION; INTRACAUDAL ONCE AS NEEDED
Status: DISCONTINUED | OUTPATIENT
Start: 2024-06-13 | End: 2024-06-13

## 2024-06-13 RX ORDER — ONDANSETRON 2 MG/ML
4 INJECTION INTRAMUSCULAR; INTRAVENOUS EVERY 8 HOURS PRN
Status: DISCONTINUED | OUTPATIENT
Start: 2024-06-13 | End: 2024-06-14 | Stop reason: HOSPADM

## 2024-06-13 RX ORDER — DOCUSATE SODIUM 100 MG/1
100 CAPSULE, LIQUID FILLED ORAL 2 TIMES DAILY
Status: DISCONTINUED | OUTPATIENT
Start: 2024-06-13 | End: 2024-06-14 | Stop reason: HOSPADM

## 2024-06-13 RX ADMIN — Medication 250 UNITS: at 03:33

## 2024-06-13 RX ADMIN — RHO(D) IMMUNE GLOBULIN (HUMAN) 300 MCG: 1500 SOLUTION INTRAMUSCULAR at 16:00

## 2024-06-13 RX ADMIN — SODIUM CHLORIDE, POTASSIUM CHLORIDE, SODIUM LACTATE AND CALCIUM CHLORIDE 999 ML/HR: 600; 310; 30; 20 INJECTION, SOLUTION INTRAVENOUS at 03:00

## 2024-06-13 RX ADMIN — IBUPROFEN 600 MG: 600 TABLET, FILM COATED ORAL at 04:46

## 2024-06-13 NOTE — L&D DELIVERY NOTE
DELIVERY NOTE  Ladi Lora 34 y.o. female MRN: [id number]  Unit/Bed#: -01 Encounter: [id number]    Obstetrician:    Dr. uJne Crain MD    Assistant:   Dr. Lay Kingsley MD    Pre-Delivery Diagnosis:   Patient Active Problem List   Diagnosis    Family history of endocrine and metabolic disease    Previous child born with ventricular septal defect, currently pregnant    Cystic fibrosis carrier in first trimester, antepartum    Post-term pregnancy, 40-42 weeks of gestation    41 weeks gestation of pregnancy    HSV (herpes simplex virus) infection         Post-Delivery Diagnosis:   Same as above - Delivered  Viable female fetus  Nuchal cord x 1      Procedure:  Spontaneous vaginal delivery      Anesthesia:  none    Specimens:   Cord blood obtained   Placenta; normal appearing, central insertion, intact   Arterial and venous blood gases (below)     Gases:  Umbilical Cord Venous Blood Gas:  Results from last 7 days   Lab Units 24  0329   PH COV  7.361   PCO2 COV mm HG 38.9   HCO3 COV mmol/L 21.5   BASE EXC COV mmol/L -3.4*   O2 CT CD VB mL/dL 19.0   O2 HGB, VENOUS CORD % 74.4     Umbilical Cord Arterial Blood Gas:  Results from last 7 days   Lab Units 24  0329   PH COA  7.291   PCO2 COA  52.3   PO2 COA mm HG 17.0   HCO3 COA mmol/L 24.6   BASE EXC COA mmol/L -2.9*   O2 CONTENT CORD ART ml/dl 7.9   O2 HGB, ARTERIAL CORD % 30.5       Quantitative Blood Loss:   95 mL           Complications:    None    Brief Description of Labor Course:  Ladi Lora is a 34 y.o.  female admitted to L&D at 41w1d for labor with spontaneous rupture of membranes.  On initial presentation patient was 9/100/+1.  She progressed to complete at 0314, pushed for 14 min, and delivered a healthy  at 0328.     Description of Delivery:   With  the assistance of maternal expulsive forces, the fetal vertex delivered spontaneously. A nuchal cord was  noted and reduced. The anterior  right shoulder  was delivered atraumatically with gentle downward traction. The contralateral arm was delivered with gentle upward traction resulting in a viable female .     Upon delivery, the infant was placed on the mothers abdomen and the cord was doubly clamped and cut after 30 seconds. The  was noted to have good tone and cry spontaneously. . There was no evidence of injury.  The  was passed off to  staff for evaluation. Umbilical cord blood and umbilical artery and venous gases were collected and sent to the lab. An intact placenta was delivered spontaneously at 0333 using fundal massage and gentle cord traction and was noted to have a centrally-inserted 3-vessel cord. Active management of the third stage of labor was undertaken with IV pitocin at 250milliunits/min.    Bleeding was noted to be steady. Pitocin was opened wide with appropriate response in uterine tone and decrease in bleeding.      Outcome:  Living  with APGARS 8 (1 min) and 9 (5 min).    weight: pending    Perineal Inspection/Laceration Repair  Inspection of the perineum, vagina, labia, cervix, and urethra revealed no laceration.    At the conclusion of the delivery, all needle, sponge, and instrument counts were noted to be correct. Patient tolerated the procedure well and was allowed to recover in labor and delivery room with family and  before being transferred to the post-partum floor.     Conclusion:  Mother and baby are currently recovering nicely in stable condition.    Attending Supervision:   Dr. June Crain MD was present for the entire procedure.    Lay Kingsley MD  OB/GYN PGY-2   2024 3:47 AM

## 2024-06-13 NOTE — DISCHARGE SUMMARY
Discharge Summary - Ladi Lora 34 y.o. female MRN: [id number]    Unit/Bed#: -01 Encounter: [id number]    Admission Date: 2024     Discharge Date: 2024    Patient Active Problem List   Diagnosis    Family history of endocrine and metabolic disease    Previous child born with ventricular septal defect, currently pregnant    Cystic fibrosis carrier in first trimester, antepartum    Post-term pregnancy, 40-42 weeks of gestation     (spontaneous vaginal delivery)    HSV (herpes simplex virus) infection    Rh negative status during pregnancy     * Rubella equivocal; MMR ordered post partum    OBGYN Practice: Dr. Whalenski    [facility] Course:   Ladi Lora is a 34 y.o.  who was admitted at 41w1d for labor with spontaneous rupture of membranes.  On initial presentation patient was 9/100/+1.  She progressed to complete, pushed 15 min and delivered uneventfully.      Delivery Findings:  Ladi delivered a viable female  on 2024 3:28 AM via Vaginal, Spontaneous . The delivery was uncomplicated.    Baby's Weight: 3180 g (7 lb 0.2 oz); 112.17    Apgar scores: 8  and 9  at 1 and 5 minutes, respectively  Anesthesia: None,   QBL: Non-Surgical QBL (mL): 95         was transferred to  nursery. Patient tolerated the procedure well and was transferred to recovery in stable condition.     Her post-partum course was uncomplicated.  Her post-partum pain was well controlled with oral analgesics. On day of discharge she was ambulating, voiding spontaneously, tolerating oral intake and hemodynamically stable. Mom's blood type is A negative and  Rhogam was given.    She was discharged home on postpartum day #1 without complications. Patient was instructed to follow up with her OB as an outpatient and was given appropriate warnings to call doctor or provider if she develops signs of infection or uncontrolled pain.    Discharge Problem List by Issue:   Rh negative status  during pregnancy  Assessment & Plan  Received Rhogam     HSV (herpes simplex virus) infection  Assessment & Plan  On valtrex suppression  Unable to perform speculum exam on admission secondary to advanced cervical dilation    Cystic fibrosis carrier in first trimester, antepartum  Assessment & Plan  Sister with CF. She is a carrier but FOB is not a carrier.    *  (spontaneous vaginal delivery)  Assessment & Plan  Routine postpartum care  Encourage ambulation  Encourage familial bonding  Lactation support as needed  Pain: Motrin/Tylenol around the clock           Disposition: Home    Planned Readmission: No    Discharge Medications:   Please see AVS    Discharge instructions :   -Do not place anything (no partner, tampons or douche) in your vagina for 6 weeks.  -You may walk for exercise for the first 6 weeks then gradually return to your usual activities.   -You may take baths or shower per your preference.   -Please look at your bust (breasts) in the mirror daily and call your doctor for redness or tenderness or increased warmth.   -Please call your doctor's office if temperature > 100.4*F or 38* C, worsening pain or a foul discharge.    Follow Up:  - Follow up in 6 weeks for postpartum visit

## 2024-06-13 NOTE — PLAN OF CARE
Problem: ANTEPARTUM  Goal: Maintain pregnancy as long as maternal and/or fetal condition is stable  Description: INTERVENTIONS:  - Maternal surveillance  - Fetal surveillance  - Monitor uterine activity  - Medications as ordered  - Bedrest  Outcome: Completed     Problem: BIRTH - VAGINAL/ SECTION  Goal: Fetal and maternal status remain reassuring during the birth process  Description: INTERVENTIONS:  - Monitor vital signs  - Monitor fetal heart rate  - Monitor uterine activity  - Monitor labor progression (vaginal delivery)  - DVT prophylaxis  - Antibiotic prophylaxis  Outcome: Completed  Goal: Emotionally satisfying birthing experience for mother/fetus  Description: Interventions:  - Assess, plan, implement and evaluate the nursing care given to the patient in labor  - Advocate the philosophy that each childbirth experience is a unique experience and support the family's chosen level of involvement and control during the labor process   - Actively participate in both the patient's and family's teaching of the birth process  - Consider cultural, Caodaism and age-specific factors and plan care for the patient in labor  Outcome: Completed

## 2024-06-13 NOTE — DISCHARGE INSTRUCTIONS
Vaginal Delivery   WHAT YOU SHOULD KNOW:   A vaginal delivery is the birth of your baby through your vagina (birth canal).        AFTER YOU LEAVE:   Medicines:  NSAIDs  help decrease swelling and pain or fever. This medicine is available with or without a doctor's order. NSAIDs can cause stomach bleeding or kidney problems in certain people. If you take blood thinner medicine, always ask your healthcare provider if NSAIDs are safe for you. Always read the medicine label and follow directions.    Take your medicine as directed.  Call your healthcare provider if you think your medicine is not helping or if you have side effects. Tell him if you are allergic to any medicine. Keep a list of the medicines, vitamins, and herbs you take. Include the amounts, and when and why you take them. Bring the list or the pill bottles to follow-up visits. Carry your medicine list with you in case of an emergency.  Follow up with your primary healthcare provider:  Most women need to return 6 weeks after a vaginal delivery. Ask about how to care for your wounds or stitches. Write down your questions so you remember to ask them during your visits.  Activity:  Rest as much as possible. Try to keep all activities short. You may be able to do some exercise soon after you have your baby. Talk with your primary healthcare provider before you start exercising. If you work outside the home, ask when you can return to your job.  Kegel exercises:  Kegel exercises may help your vaginal and rectal muscles heal faster. You can do Kegel exercises by tightening and relaxing the muscles around your vagina. Kegel exercises help make the muscles stronger.   Breast care:  When your milk comes in, your breasts may feel full and hard. Ask how to care for your breasts, even if you are not breastfeeding.   Constipation:  Do not try to push the bowel movement out if it is too hard. High-fiber foods, extra liquids, and regular exercise can help you prevent  constipation. Examples of high-fiber foods are fruit and bran. Prune juice and water are good liquids to drink. Regular exercise helps your digestive system work. You may also be told to take over-the-counter fiber and stool softener medicines. Take these items as directed.   Hemorrhoids:  Pregnancy can cause severe hemorrhoids. You may have rectal pain because of the hemorrhoids. Ask how to prevent or treat hemorrhoids.  Perineum care:  Your perineum is the area between your vagina and anus. Keep the area clean and dry to help it heal and to prevent infection. Wash the area gently with soap and water when you bathe or shower. Rinse your perineum with warm water when you use the toilet. Your primary healthcare provider may suggest you use a warm sitz bath to help decrease pain. A sitz bath is a bathtub or basin filled to hip level. Stay in the sitz bath for 20 to 30 minutes, or as directed.   Vaginal discharge:  You will have vaginal discharge, called lochia, after your delivery. The lochia is bright red the first day or two after the birth. By the fourth day, the amount decreases, and it turns red-brown. Use a sanitary pad rather than a tampon to prevent a vaginal infection. It is normal to have lochia up to 8 weeks after your baby is born.   Monthly periods:  Your period may start again within 7 to 12 weeks after your baby is born. If you are breastfeeding, it may take longer for your period to start again. You can still get pregnant again even though you do not have your monthly period. Talk with your primary healthcare provider about a birth control method that will be good for you if you do not want to get pregnant.  Mood changes:  Many new mothers have some kind of mood changes after delivery. Some of these changes occur because of lack of sleep, hormone changes, and caring for a new baby. Some mood changes can be more serious, such as postpartum depression. Talk with your primary healthcare provider if you  feel unable to care for yourself or your baby.  Sexual activity:  You may need to avoid sex for 6 to 7 weeks after you have your baby. You may notice you have a decreased desire for sex, or sex may be painful. You may need to use a vaginal lubricant (gel) to help make sex more comfortable.  Contact your primary healthcare provider if:   You have heavy vaginal bleeding that fills 1 or more sanitary pads in 1 hour.    You have a fever.    Your pain does not go away, or gets worse.    The skin between your vagina and rectum is swollen, warm, or red.    You have swollen, hard, or painful breasts.    You feel very sad or depressed.    You feel more tired than usual.     You have questions or concerns about your condition or care.  Seek care immediately or call 911 if:   You have pus or yellow drainage coming from your vagina or wound.    You are urinating very little, or not at all.    Your arm or leg feels warm, tender, and painful. It may look swollen and red.    You feel lightheaded, have sudden and worsening chest pain, or trouble breathing. You may have more pain when you take deep breaths or cough, or you may cough up blood.  © 2014 [employer] Information is for End User's use only and may not be sold, redistributed or otherwise used for commercial purposes. All illustrations and images included in CareNotes® are the copyrighted property of [employer] or [employer].  The above information is an  only. It is not intended as medical advice for individual conditions or treatments. Talk to your doctor, nurse or pharmacist before following any medical regimen to see if it is safe and effective for you.

## 2024-06-13 NOTE — PLAN OF CARE
Problem: ANTEPARTUM  Goal: Maintain pregnancy as long as maternal and/or fetal condition is stable  Description: INTERVENTIONS:  - Maternal surveillance  - Fetal surveillance  - Monitor uterine activity  - Medications as ordered  - Bedrest  Outcome: Progressing     Problem: BIRTH - VAGINAL/ SECTION  Goal: Fetal and maternal status remain reassuring during the birth process  Description: INTERVENTIONS:  - Monitor vital signs  - Monitor fetal heart rate  - Monitor uterine activity  - Monitor labor progression (vaginal delivery)  - DVT prophylaxis  - Antibiotic prophylaxis  Outcome: Progressing  Goal: Emotionally satisfying birthing experience for mother/fetus  Description: Interventions:  - Assess, plan, implement and evaluate the nursing care given to the patient in labor  - Advocate the philosophy that each childbirth experience is a unique experience and support the family's chosen level of involvement and control during the labor process   - Actively participate in both the patient's and family's teaching of the birth process  - Consider cultural, Restorationism and age-specific factors and plan care for the patient in labor  Outcome: Progressing

## 2024-06-13 NOTE — LACTATION NOTE
This note was copied from a baby's chart.  CONSULT - LACTATION  Baby Girl Lora (Olivia) 0 days female MRN: [id number]    [facility] NURSERY Room / Bed: (N)/(N) Encounter: [id number]    Maternal Information     MOTHER:  Ladi Lora  Maternal Age: 34 y.o.  OB History: # 1 - Date: 10/26/16, Sex: Male, Weight: 3289 g (7 lb 4 oz), GA: 41w0d, Type: Vaginal, Spontaneous, Apgar1: None, Apgar5: None, Living: Living, Birth Comments: None    # 2 - Date: 11/07/18, Sex: Male, Weight: 3374 g (7 lb 7 oz), GA: 41w3d, Type: Vaginal, Spontaneous, Apgar1: 8, Apgar5: 9, Living: Living, Birth Comments: None    # 3 - Date: 06/22/21, Sex: Male, Weight: 3374 g (7 lb 7 oz), GA: 41w0d, Type: Vaginal, Spontaneous, Apgar1: None, Apgar5: None, Living: Living, Birth Comments: None    # 4 - Date: 06/13/24, Sex: Female, Weight: 3180 g (7 lb 0.2 oz), GA: 41w1d, Type: Vaginal, Spontaneous, Apgar1: 8, Apgar5: 9, Living: Living, Birth Comments: None   Previouse breast reduction surgery? No    Lactation history:   Has patient previously breast fed: Yes   How long had patient previously breast fed: 7 months with previous children   Previous breast feeding complications: None     Past Surgical History:   Procedure Laterality Date    NASAL SEPTUM SURGERY  2007    REFRACTIVE SURGERY  2012       Birth information:  YOB: 2024   Time of birth: 3:28 AM   Sex: female   Delivery type: Vaginal, Spontaneous   Birth Weight: 3180 g (7 lb 0.2 oz)   Percent of Weight Change: 0%     Gestational Age: 41w1d   [unfilled]    Assessment        06/13/24 1024   Lactation Consultation   Reason for Consult 20   Maternal Information   Has mother  before? Yes   How long did the the mother previously breastfeed? 7 months with previous children   Previous Maternal Breastfeeding Challenges None   Infant to breast within first hour of birth? Yes   Breasts/Nipples   Breastfeeding Status Yes    Breastfeeding Progress Not yet established   Breast Pump   Pump 3  (Lansinoh wearable pump)   Pump Review/Education Milk storage   Patient Follow-Up   Lactation Consult Status 2   Follow-Up Type Inpatient;Call as needed   Other OB Lactation Documentation    Additional Problem Noted Experienced BF mom, states breastfeeding is going well thus far. Mom denies any questions or concerns at this time. Booklets reviewed. Enc to call for latch assessment.  (RSB and DC booklet reviewed.)       Feeding recommendations:  breast feed on demand    Met with mother. Provided mother with Ready, Set, Baby booklet which contained information on:  Hand expression with access to QR codes to review hand expression.  Positioning and latch reviewed as well as showing images of other feeding positions.  Discussed the properties of a good latch in any position.   Feeding on cue and what that means for recognizing infant's hunger, s/s that baby is getting enough milk and some s/s that breastfeeding dyad may need further help  Skin to Skin contact an benefits to mom and baby  Avoidance of pacifiers for the first month discussed.   Gave information on common concerns, what to expect the first few weeks after delivery, preparing for other caregivers, and how partners can help. Resources for support also provided.    Met with mother to go over discharge breastfeeding booklet including the feeding log. Emphasized 8 or more (12) feedings in a 24 hour period, what to expect for the number of diapers per day of life and the progression of properties of the  stooling pattern.    List of reasons to call a lactation consultant.  Feeding logs  Feeding cues  Hand expression  Baby's Second day (cluster feeding)  Breastfeeding and Your Lifestyle (Medications, Alcohol, Caffeine, Smoking, Street Drugs, Methadone)  First Two Weeks Survival Guide for Breastfeeding  Breast Changes  Physical Therapy  Storage and Handling of Breast milk  How to Keep Your  Breast Pump Kit Clean  The Employed Breastfeeding Mother  Mixed feeding  Bottle feeding like breastfeeding (paced bottle feeding)  astfeeding and your lifestyle, storage and preparation of breast milk, how to keep you breast pump clean, the employed breastfeeding mother and paced bottle feeding handouts.     Booklet included Breastfeeding Resources for after discharge including access to the number for the [facility].      Leilani Hanley 6/13/2024 10:26 AM

## 2024-06-13 NOTE — H&P
H & P- Obstetrics   Ladi Lora 34 y.o. female MRN: [id number]  Unit/Bed#: -01 Encounter: [id number]    Assessment: 34 y.o.  at 41w1d admitted for labor and SROM  SVE: 9/100/+1  Clinical EFW: 80th percentile ; Cephalic confirmed by ultrasound  GBS status: negative   FHT:  Baseline of 115/moderate variability/15 x 15 accels present/no decelerations.  Category 1 tracing.      Plan:   HSV (herpes simplex virus) infection  Assessment & Plan  On valtrex suppression  Unable to perform speculum exam on admission secondary to advanced cervical dilation    41 weeks gestation of pregnancy  Assessment & Plan  Admit to OBN   Clear liquid diet   F/u T&S, CBC, RPR   IVF LR 125cc/hr   Continuous fetal monitoring and tocometry   Analgesia at maternal request   Vertex by KATIE        Cystic fibrosis carrier in first trimester, antepartum  Assessment & Plan  Sister with CF. She is a carrier but FOB is not a carrier.          Discussed case and plan w/ Dr. Crain      Chief Complaint: contractions and leaking fluid    HPI: Ladi Lora is a 34 y.o.  with an LITO of 2024, by Last Menstrual Period at 41w1d who is being admitted for labor  and spontaneous rupture of membranes. She complains of uterine contractions, occurring every 5 minutes, has large amounts of fluid leaking, and reports spotting. She states she has felt good FM.    Patient Active Problem List   Diagnosis    Family history of endocrine and metabolic disease    Previous child born with ventricular septal defect, currently pregnant    Cystic fibrosis carrier in first trimester, antepartum    Post-term pregnancy, 40-42 weeks of gestation    41 weeks gestation of pregnancy    HSV (herpes simplex virus) infection       Baby complications/comments: none    Review of Systems   Constitutional:  Negative for chills and fever.   Respiratory:  Negative for cough, shortness of breath and wheezing.    Cardiovascular:  Negative for chest  pain and leg swelling.   Gastrointestinal:  Negative for abdominal pain, diarrhea, nausea and vomiting.   Genitourinary:  Negative for pelvic pain, vaginal bleeding and vaginal discharge.   Musculoskeletal:  Negative for back pain.   Neurological:  Negative for weakness, light-headedness and headaches.       OB Hx:  OB History    Para Term  AB Living   4 3 3 0 0 3   SAB IAB Ectopic Multiple Live Births         0 3      # Outcome Date GA Lbr Pedro/2nd Weight Sex Type Anes PTL Lv   4 Current            3 Term 21 41w0d  3374 g (7 lb 7 oz) M Vag-Spont   ALISON   2 Term 18 41w3d 04:40 / 00:43 3374 g (7 lb 7 oz) M Vag-Spont None N ALISON   1 Term 10/26/16 41w0d  3289 g (7 lb 4 oz) M Vag-Spont EPI N ALISON       Past Medical Hx:  Past Medical History:   Diagnosis Date    Anemia     1 episode    Back problem 2018    did physical therapy earlier this year    Cystic fibrosis carrier     Herpes     Seasonal allergies     Varicella        Past Surgical hx:  Past Surgical History:   Procedure Laterality Date    NASAL SEPTUM SURGERY      REFRACTIVE SURGERY         Social Hx:  Alcohol use: denies  Tobacco use: denies  Other substance use: denies    Other:     Allergies   Allergen Reactions    Amoxicillin-Pot Clavulanate Rash       No medications prior to admission.    Objective:  HR:  [62] 62  BP: (116)/(72) 116/72  There is no height or weight on file to calculate BMI.     Physical Exam:  Physical Exam  Constitutional:       Appearance: Normal appearance.   Cardiovascular:      Rate and Rhythm: Normal rate and regular rhythm.   Pulmonary:      Effort: Pulmonary effort is normal. No respiratory distress.   Abdominal:      Palpations: Abdomen is soft.      Tenderness: There is no abdominal tenderness.   Musculoskeletal:         General: No swelling or tenderness.   Neurological:      General: No focal deficit present.      Mental Status: She is alert and oriented to person, place, and time.   Skin:      "General: Skin is warm and dry.   Vitals reviewed.                 Lab Results   Component Value Date    WBC 13.67 (H) 06/13/2024    HGB 13.3 06/13/2024    HCT 38.0 06/13/2024     06/13/2024     No results found for: \"NA\", \"K\", \"CL\", \"CO2\", \"BUN\", \"CREATININE\", \"GLUCOSE\", \"AST\", \"ALT\"  Prenatal Labs: Reviewed      Blood type: A negative  Antibody: Negative  GBS: Negaitve  HIV: Non-reactive  Rubella: Equivocal   Syphilis IgM/IgG: Non-reactive  HBsAg: Non-reactive  HCAb: Not completed  Chlamydia: Negative  Gonorrhea: Negative  Diabetes 1 hour screen: 72  3 hour glucose: Not indicated  Platelets: Followup admission CBC  Hgb: Followup admission CBC  >2 Midnights  INPATIENT     Signature/Title: Lay Kingsley MD  Date: 6/13/2024  Time: 3:00 AM    "

## 2024-06-14 ENCOUNTER — LACTATION ENCOUNTER (OUTPATIENT)
Dept: NURSERY | Facility: HOSPITAL | Age: 34
End: 2024-06-14

## 2024-06-14 VITALS
OXYGEN SATURATION: 98 % | HEIGHT: 70 IN | TEMPERATURE: 98.5 F | DIASTOLIC BLOOD PRESSURE: 56 MMHG | WEIGHT: 194 LBS | BODY MASS INDEX: 27.77 KG/M2 | SYSTOLIC BLOOD PRESSURE: 113 MMHG | RESPIRATION RATE: 16 BRPM | HEART RATE: 60 BPM

## 2024-06-14 PROBLEM — O26.899 RH NEGATIVE STATUS DURING PREGNANCY: Status: RESOLVED | Noted: 2024-06-14 | Resolved: 2024-06-14

## 2024-06-14 PROBLEM — O26.899 RH NEGATIVE STATUS DURING PREGNANCY: Status: ACTIVE | Noted: 2024-06-14

## 2024-06-14 PROBLEM — Z67.91 RH NEGATIVE STATUS DURING PREGNANCY: Status: ACTIVE | Noted: 2024-06-14

## 2024-06-14 PROBLEM — Z67.91 RH NEGATIVE STATUS DURING PREGNANCY: Status: RESOLVED | Noted: 2024-06-14 | Resolved: 2024-06-14

## 2024-06-14 PROBLEM — O48.0 POST-TERM PREGNANCY, 40-42 WEEKS OF GESTATION: Status: RESOLVED | Noted: 2024-06-12 | Resolved: 2024-06-14

## 2024-06-14 RX ORDER — ACETAMINOPHEN 325 MG/1
650 TABLET ORAL EVERY 4 HOURS PRN
Status: CANCELLED
Start: 2024-06-14

## 2024-06-14 RX ORDER — BENZOCAINE/MENTHOL 6 MG-10 MG
1 LOZENGE MUCOUS MEMBRANE DAILY PRN
Start: 2024-06-14

## 2024-06-14 RX ORDER — BENZOCAINE/MENTHOL 6 MG-10 MG
1 LOZENGE MUCOUS MEMBRANE DAILY PRN
Status: CANCELLED
Start: 2024-06-14

## 2024-06-14 RX ORDER — ACETAMINOPHEN 325 MG/1
650 TABLET ORAL EVERY 4 HOURS PRN
Start: 2024-06-14

## 2024-06-14 RX ORDER — IBUPROFEN 600 MG/1
600 TABLET ORAL EVERY 6 HOURS
Qty: 30 TABLET | Refills: 0 | Status: SHIPPED | OUTPATIENT
Start: 2024-06-14

## 2024-06-14 RX ORDER — IBUPROFEN 600 MG/1
600 TABLET ORAL EVERY 6 HOURS
Qty: 30 TABLET | Refills: 0 | Status: CANCELLED | OUTPATIENT
Start: 2024-06-14

## 2024-06-14 NOTE — LACTATION NOTE
This note was copied from a baby's chart.    Experienced Mom states nursing is going well. Review of  positioning and alignment.   Mom is encouraged to:     - Bring baby up to the breast (use of pillows to elevate so baby's torso is against mom's breasts)   - Skin to skin for feedings with top hand exposed to show signs of satiation   - Chin deep into breast tissue (make baby look up to the nipple)   - nose aligned to the nipple   -Wait for wide gape, drag chin on the breast so nipple is aimed at the upper, back palate  - Cheek should be touching breast   - Deep, firm hold of baby with ear, shoulder, hip alignment    Denies need for assistance OR supplies. Dad remains supportive at bedside.      06/14/24 1000   Maternal Information   Has mother  before? Yes   How long did the the mother previously breastfeed? 7 months with other kids   Previous Maternal Breastfeeding Challenges None   Infant to breast within first hour of birth? Yes   Exclusive Pump and Bottle Feed No   L[facility] Documentation   Having latch problems? No  (as per Mom with experience)   Breasts/Nipples   Breastfeeding Status Yes   Breastfeeding Progress Not yet established   Breast Pump   Pump 3  (has pump from Insurance)   Patient Follow-Up   Lactation Consult Status 2   Follow-Up Type Inpatient;Call as needed   Other OB Lactation Documentation    Additional Problem Noted Experienced Mom states nursing is going well  (has BOTH Booklets)     Encouraged parents to call for assistance, questions, and concerns about breastfeeding.  Extension provided. Reminded of outpatient support available.

## 2024-06-14 NOTE — ASSESSMENT & PLAN NOTE
On valtrex suppression  Unable to perform speculum exam on admission secondary to advanced cervical dilation  
Received Rhogam 6/13  
Routine postpartum care  Encourage ambulation  Encourage familial bonding  Lactation support as needed  Pain: Motrin/Tylenol around the clock    
Sister with CF. She is a carrier but FOB is not a carrier.  
today

## 2024-06-14 NOTE — NURSING NOTE
Maternal/ discharge education completed with patient. Patient verbalized understanding and had no questions. Encouraged by nursing staff to call if any questions arise before discharge. Discharge handout given and reviewed.

## 2024-06-14 NOTE — PROGRESS NOTES
Progress Note - OB/GYN   Ladi Lora 34 y.o. female MRN: [id number]  Unit/Bed#:  413-01 Encounter: [id number]      Assessment/Plan    Ladi Lora is a 34 y.o.  who is PPD 1 s/p  at 41w1d     Rh negative status during pregnancy  Assessment & Plan  Received Rhogam     HSV (herpes simplex virus) infection  Assessment & Plan  On valtrex suppression  Unable to perform speculum exam on admission secondary to advanced cervical dilation    Cystic fibrosis carrier in first trimester, antepartum  Assessment & Plan  Sister with CF. She is a carrier but FOB is not a carrier.    *  (spontaneous vaginal delivery)  Assessment & Plan  Routine postpartum care  Encourage ambulation  Encourage familial bonding  Lactation support as needed  Pain: Motrin/Tylenol around the clock             Disposition: Anticipate discharge home postpartum Day #1  Barriers to discharge: ongoing couplet care      Subjective/Objective     Subjective: Postpartum state    Pain: no  Tolerating PO: yes  Voiding: yes  Flatus: yes  BM: yes  Ambulating: yes  Breastfeeding: Breastfeeding  Chest pain: no  Shortness of breath: no  Leg pain: no  Lochia: minimal    Objective:     Vitals:  Vitals:    24 1500 24 1900 24 2300 24 0330   BP: 108/69 114/76 103/62 110/75   BP Location: Left arm Left arm Left arm Left arm   Pulse: 61 65 62 61   Resp: 16 16 16 14   Temp: 97.6 °F (36.4 °C) 98 °F (36.7 °C) 98.4 °F (36.9 °C) 98 °F (36.7 °C)   TempSrc: Temporal Temporal Temporal Oral   SpO2: 98%      Weight:       Height:           Physical Exam:   GEN: appears well, alert and oriented x 3, pleasant and cooperative   CV: Regular rate and rhythm  RESP: non labored breathing, lungs clear to auscultation  ABDOMEN: soft, no tenderness, no distention, Uterine fundus firm and non-tender, -2 cm below the umbilicus  EXTREMITIES: non-tender  NEURO Alert and oriented to person, place, and time.       Lab Results   Component  Value Date    WBC 13.67 (H) 06/13/2024    HGB 13.3 06/13/2024    HCT 38.0 06/13/2024    MCV 97 06/13/2024     06/13/2024         Desi Mcgee MD  Obstetrics & Gynecology  06/14/24

## 2024-06-21 LAB — PLACENTA IN STORAGE: NORMAL

## 2024-10-22 ENCOUNTER — OFFICE VISIT (OUTPATIENT)
Dept: FAMILY MEDICINE CLINIC | Facility: CLINIC | Age: 34
End: 2024-10-22
Payer: COMMERCIAL

## 2024-10-22 ENCOUNTER — TELEPHONE (OUTPATIENT)
Dept: ADMINISTRATIVE | Facility: OTHER | Age: 34
End: 2024-10-22

## 2024-10-22 VITALS
DIASTOLIC BLOOD PRESSURE: 62 MMHG | SYSTOLIC BLOOD PRESSURE: 120 MMHG | HEART RATE: 68 BPM | BODY MASS INDEX: 22.05 KG/M2 | HEIGHT: 70 IN | OXYGEN SATURATION: 99 % | WEIGHT: 154 LBS | TEMPERATURE: 97.1 F

## 2024-10-22 DIAGNOSIS — Z00.00 PE (PHYSICAL EXAM), ANNUAL: Primary | ICD-10-CM

## 2024-10-22 DIAGNOSIS — E55.9 VITAMIN D DEFICIENCY: ICD-10-CM

## 2024-10-22 DIAGNOSIS — Z82.49 FAMILY HISTORY OF PREMATURE CAD: ICD-10-CM

## 2024-10-22 DIAGNOSIS — B00.9 HSV (HERPES SIMPLEX VIRUS) INFECTION: ICD-10-CM

## 2024-10-22 PROCEDURE — 99204 OFFICE O/P NEW MOD 45 MIN: CPT | Performed by: FAMILY MEDICINE

## 2024-10-22 NOTE — TELEPHONE ENCOUNTER
Upon review of the In Basket request and the patient's chart, initial outreach has been made via fax to facility. Please see Contacts section for details.     Thank you  Irvin Mendez MA

## 2024-10-22 NOTE — TELEPHONE ENCOUNTER
----- Message from Sherrie ROBERT sent at 10/22/2024  8:53 AM EDT -----  Regarding: care gap request  10/22/24 8:53 AM    Hello, our patient attached above has had Pap Smear (HPV) aka Cervical Cancer Screening completed/performed. Please assist in updating the patient chart by making an External outreach to    [facility] located in [place]. The date of service is 2024/2023 .    Thank you,  Sherrie Savage  [facility] PRIMARY CARE

## 2024-10-22 NOTE — LETTER
Procedure Request Form: Cervical Cancer Screening      Date Requested: 10/22/24  Patient: Ladi Lora  Patient : 1990   Referring Provider: Christine Cooley MD        Date of Procedure ______________________________       The above patient has informed us that they have completed their   most recent Cervical Cancer Screening at your facility. Please complete   this form and attach all corresponding procedure reports/results.    Comments __________________________________________________________  ____________________________________________________________________  ____________________________________________________________________  ____________________________________________________________________    Facility Completing Procedure _________________________________________    Form Completed By (print name) _______________________________________      Signature __________________________________________________________      These reports are needed for  compliance.    Please fax this completed form and a copy of the procedure report to our office located at [address] [place] [address] as soon as possible to Fax [phone] gabby Bryan: Phone [phone]    We thank you for your assistance in treating our mutual patient.

## 2024-10-22 NOTE — PROGRESS NOTES
Ambulatory Visit  Name: Ladi Lora      : 1990      MRN: [id number]  Encounter Provider: Christine Cooley MD  Encounter Date: 10/22/2024   Encounter department: [facility] PRIMARY CARE    Ladi is a pleasant and healthy 34-year-old female who presents today to establish new patient status.  She is 6 months postpartum and doing well.  She offers no acute complaints today.  Assessment & Plan  PE (physical exam), annual  Healthy female.  Check annual labs    Orders:    CBC and differential; Future    Comprehensive metabolic panel; Future    Lipid panel; Future    T4; Future    TSH, 3rd generation; Future    Vitamin D 25 hydroxy; Future    CBC and differential    Comprehensive metabolic panel    Lipid panel    T4    TSH, 3rd generation    Vitamin D 25 hydroxy    Vitamin D deficiency    Orders:    Vitamin D 25 hydroxy; Future    Vitamin D 25 hydroxy    HSV (herpes simplex virus) infection  Labial herpes infections, infrequent.    Managed by her gynecologist, Dr. Crain.       Family history of premature CAD  Check lipids.        Routine blood work ordered.  Call with results.  Patient to return to office in 1 year, or as needed.      History of Present Illness     Chief Complaint   Patient presents with    New Patient Visit     Establishing care , has been awhile since seeing a doctor, recently had a baby is currently breast feeding offers no complaints     Pt had pap done, Dr. Crain care gap message sent to update           Ladi is a pleasant 34-year-old female who presents today to establish new patient status.  Just had 4th child -  now age 4 months; nursing  Requests PCP    HPI:  Requests PCP      PMHx:  None  Carrier for cystic fibrosis    Surg HX:  Deviated septum age 17  Lasik on eyes 11 years ago    OB/GYN  Dr. 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  Enjoys working out and reading; getting back to [employer]  Never smoke  Never vape  Occ ETOH      FAM HX:  Children 7, 5, 3, and 4 months (3 boys and a girl)   - supportive; feels safe; construction planning  Both sides of family local and supportive  Mother - well  Father - MI age 54, stents placed  M Uncle - premature CAD  Cousin - premature CAD  Mgm -  in 80's lung cancer; smoker  MGF - prostate cancer in age 50s  PGF - lung cancer - smoker  PGM -  80's - old age; CVA; breast cancer  1 sister - cystic fibrosis      MEDS  PNV  Calcium      ALL:  Amox - rash on hands          Review of Systems   Constitutional:         See HPI   HENT:  Negative for congestion, ear pain, mouth sores, sinus pressure and trouble swallowing.    Eyes:  Negative for discharge, redness and itching.   Respiratory:  Negative for apnea, cough, chest tightness, shortness of breath, wheezing and stridor.    Cardiovascular:  Negative for chest pain, palpitations and leg swelling.   Gastrointestinal:  Negative for abdominal distention, abdominal pain, blood in stool, constipation, diarrhea, nausea and vomiting.   Endocrine: Negative for cold intolerance and heat intolerance.   Genitourinary:  Negative for difficulty urinating, dysuria, flank pain and urgency.   Musculoskeletal:  Negative for arthralgias and myalgias.   Skin:  Negative for rash.   Neurological:  Negative for dizziness, seizures, syncope, speech difficulty, weakness, light-headedness, numbness and headaches.   Hematological:  Negative for adenopathy.   Psychiatric/Behavioral:  Negative for agitation, behavioral problems, confusion and sleep disturbance. The patient is not nervous/anxious.                There were no vitals taken for this visit.    Physical Exam  Vitals and nursing note reviewed.   Constitutional:       General: She is not in acute distress.     Appearance: Normal  appearance. She is well-developed. She is not ill-appearing, toxic-appearing or diaphoretic.   HENT:      Right Ear: Tympanic membrane normal.      Left Ear: Tympanic membrane normal.      Mouth/Throat:      Pharynx: Oropharynx is clear. No oropharyngeal exudate or posterior oropharyngeal erythema.   Eyes:      General: No scleral icterus.  Neck:      Vascular: No carotid bruit.   Cardiovascular:      Rate and Rhythm: Normal rate and regular rhythm.      Heart sounds: Normal heart sounds. No murmur heard.     No friction rub. No gallop.   Pulmonary:      Effort: Pulmonary effort is normal. No respiratory distress.      Breath sounds: Normal breath sounds. No stridor. No wheezing, rhonchi or rales.   Chest:      Chest wall: No tenderness.   Abdominal:      General: Abdomen is flat. Bowel sounds are normal. There is no distension.      Palpations: Abdomen is soft. There is no mass.      Tenderness: There is no abdominal tenderness. There is no right CVA tenderness, left CVA tenderness, guarding or rebound.      Hernia: No hernia is present.   Musculoskeletal:         General: Normal range of motion.      Cervical back: Normal range of motion and neck supple. No rigidity or tenderness.      Right lower leg: No edema.      Left lower leg: No edema.   Lymphadenopathy:      Cervical: No cervical adenopathy.   Skin:     Coloration: Skin is not jaundiced.   Neurological:      General: No focal deficit present.      Mental Status: She is alert and oriented to person, place, and time.   Psychiatric:         Mood and Affect: Mood normal.         Behavior: Behavior normal.         Thought Content: Thought content normal.         Judgment: Judgment normal.

## 2024-10-23 NOTE — TELEPHONE ENCOUNTER
Upon review of the In Basket request we were able to locate, review, and update the patient chart as requested for Pap Smear (HPV) aka Cervical Cancer Screening.    Any additional questions or concerns should be emailed to the Practice Liaisons via the appropriate education email address, please do not reply via In Basket.    Thank you  Irvin Mendez MA   [facility]

## 2024-11-18 DIAGNOSIS — Z00.6 ENCOUNTER FOR EXAMINATION FOR NORMAL COMPARISON OR CONTROL IN CLINICAL RESEARCH PROGRAM: ICD-10-CM

## 2024-11-21 ENCOUNTER — APPOINTMENT (OUTPATIENT)
Dept: LAB | Facility: CLINIC | Age: 34
End: 2024-11-21

## 2024-11-21 DIAGNOSIS — Z00.6 ENCOUNTER FOR EXAMINATION FOR NORMAL COMPARISON OR CONTROL IN CLINICAL RESEARCH PROGRAM: ICD-10-CM

## 2024-11-21 PROCEDURE — 36415 COLL VENOUS BLD VENIPUNCTURE: CPT

## 2024-12-03 LAB
APOB+LDLR+PCSK9 GENE MUT ANL BLD/T: NOT DETECTED
BRCA1+BRCA2 DEL+DUP + FULL MUT ANL BLD/T: NOT DETECTED
MLH1+MSH2+MSH6+PMS2 GN DEL+DUP+FUL M: NOT DETECTED

## 2025-01-08 PROCEDURE — G0476 HPV COMBO ASSAY CA SCREEN: HCPCS | Performed by: OBSTETRICS & GYNECOLOGY

## 2025-01-08 PROCEDURE — G0145 SCR C/V CYTO,THINLAYER,RESCR: HCPCS | Performed by: PATHOLOGY

## 2025-01-10 ENCOUNTER — LAB REQUISITION (OUTPATIENT)
Dept: LAB | Facility: HOSPITAL | Age: 35
End: 2025-01-10
Payer: COMMERCIAL

## 2025-01-10 DIAGNOSIS — Z11.51 ENCOUNTER FOR SCREENING FOR HUMAN PAPILLOMAVIRUS (HPV): ICD-10-CM

## 2025-01-10 DIAGNOSIS — Z12.4 ENCOUNTER FOR SCREENING FOR MALIGNANT NEOPLASM OF CERVIX: ICD-10-CM

## 2025-01-10 DIAGNOSIS — Z01.419 ENCOUNTER FOR GYNECOLOGICAL EXAMINATION (GENERAL) (ROUTINE) WITHOUT ABNORMAL FINDINGS: ICD-10-CM

## 2025-01-11 LAB
HPV HR 12 DNA CVX QL NAA+PROBE: NEGATIVE
HPV16 DNA CVX QL NAA+PROBE: NEGATIVE
HPV18 DNA CVX QL NAA+PROBE: NEGATIVE

## 2025-01-16 LAB
LAB AP GYN PRIMARY INTERPRETATION: ABNORMAL
LAB AP LMP: ABNORMAL
Lab: ABNORMAL
PATH INTERP SPEC-IMP: ABNORMAL

## 2025-01-16 PROCEDURE — G0124 SCREEN C/V THIN LAYER BY MD: HCPCS | Performed by: PATHOLOGY

## 2025-02-13 ENCOUNTER — TELEMEDICINE (OUTPATIENT)
Dept: OTHER | Facility: HOSPITAL | Age: 35
End: 2025-02-13
Payer: COMMERCIAL

## 2025-02-13 DIAGNOSIS — A09 TRAVELER'S DIARRHEA: Primary | ICD-10-CM

## 2025-02-13 PROCEDURE — 99213 OFFICE O/P EST LOW 20 MIN: CPT | Performed by: PHYSICIAN ASSISTANT

## 2025-02-13 RX ORDER — AZITHROMYCIN 250 MG/1
500 TABLET, FILM COATED ORAL EVERY 24 HOURS
Qty: 6 TABLET | Refills: 0 | Status: SHIPPED | OUTPATIENT
Start: 2025-02-14 | End: 2025-02-17

## 2025-02-13 NOTE — PROGRESS NOTES
Virtual Regular Visit  Name: Ladi Lora      : 1990      MRN: [id number]  Encounter Provider: Shannon D Severino, PA-C  Encounter Date: 2025   Encounter department: VIRTUAL CARE       Verification of patient location:  Patient is located at Other in the following state in which I hold an active license [place] :  Assessment & Plan  Traveler's diarrhea    Orders:    azithromycin (ZITHROMAX) 250 mg tablet; Take 2 tablets (500 mg total) by mouth every 24 hours for 3 days Take 2 tablets today then 1 tablet daily x 4 days Do not start before 2025.  discussed risks vs benefits of starting abx. Patient will wait another day or two to see if symptoms start to subside.       Encounter provider Shannon D Severino, PA-C    The patient was identified by name and date of birth. Ladi Lora was informed that this is a telemedicine visit and that the visit is being conducted through the Epic Embedded platform. She agrees to proceed..  My office door was closed. No one else was in the room.  She acknowledged consent and understanding of privacy and security of the video platform. The patient has agreed to participate and understands they can discontinue the visit at any time.    Patient is aware this is a billable service.     History obtained from: patient with daughter in car  History of Present Illness     Pt reports was in [place] over the weekend, returned Monday night. Has had severe diarrhea since Monday night. Reports about 10 episodes of diarrhea per day without blood, but possible mucous. Nothing tried, staying hydrated. Thinks it was from drinking water. No vomiting, severe belly pain, fevers.       Review of Systems   Constitutional:  Negative for fever.   HENT:  Negative for nosebleeds.    Eyes:  Negative for redness.   Respiratory:  Negative for shortness of breath.    Cardiovascular:  Negative for chest pain.   Gastrointestinal:  Positive for abdominal pain (mild) and  diarrhea. Negative for blood in stool and vomiting.   Genitourinary:  Negative for hematuria.   Musculoskeletal:  Negative for gait problem.   Skin:  Negative for rash.   Neurological:  Negative for seizures.   Psychiatric/Behavioral:  Negative for behavioral problems.        Objective   Breastfeeding No     Physical Exam  Constitutional:       General: She is not in acute distress.     Appearance: Normal appearance. She is well-groomed and normal weight. She is not ill-appearing or toxic-appearing.   HENT:      Head: Normocephalic and atraumatic.      Nose: No rhinorrhea.      Mouth/Throat:      Mouth: Mucous membranes are moist.   Eyes:      Conjunctiva/sclera: Conjunctivae normal.   Pulmonary:      Effort: Pulmonary effort is normal. No respiratory distress.      Breath sounds: No wheezing (no gross audible wheeze through computer).   Musculoskeletal:      Cervical back: Normal range of motion.   Skin:     Findings: No rash (on face or neck).   Neurological:      Mental Status: She is alert.      Cranial Nerves: No dysarthria or facial asymmetry.   Psychiatric:         Mood and Affect: Mood normal.         Behavior: Behavior normal. Behavior is cooperative.         Visit Time  Total Visit Duration: 9 minutes not including the time spent for establishing the audio/video connection.

## 2025-02-13 NOTE — PATIENT INSTRUCTIONS
"Avoid dairy x 1-2 weeks  Clear liquid (water gatorade, pedialyte, apple/grape juice, ginger ale, jello, broth) first- nothing red  Advance to bland diet as tolerated (bananas, rice, apple sauce, toast, plain crackers, plain pasta)  No greasy, spicy or acidic foods/drinks.    Thank you for choosing to trust [facility] with your care today. Virtual visits are very convenient, but do have some limitations. Schedule a follow-up appointment with your primary care physician for recheck in person in 2-3 days-especially if symptoms aren't improving. If you cannot see your PCP, you can schedule a follow up appointment at a [facility] Now. Go to the emergency department if you develop any new or worsening symptoms including dehydration, fever, or anything else that is concerning.    Excuses can be found in \"Letters\" section of its learning ashley. Can print if opened from a web browser  [facility] phone number is [phone] if you need assistance or have further questions    [phone]  Schedule or Reschedule Outpatient Testing - Option 2  Billing - Option 3  General Info - Option 4  [facility] [employer] Help - Option 5  Comprehensive Spine Program - Option 6    "